# Patient Record
Sex: MALE | Race: ASIAN | NOT HISPANIC OR LATINO | URBAN - METROPOLITAN AREA
[De-identification: names, ages, dates, MRNs, and addresses within clinical notes are randomized per-mention and may not be internally consistent; named-entity substitution may affect disease eponyms.]

---

## 2024-03-28 ENCOUNTER — INPATIENT (INPATIENT)
Facility: HOSPITAL | Age: 59
LOS: 1 days | Discharge: HOME | DRG: 638 | End: 2024-03-30
Attending: FAMILY MEDICINE | Admitting: INTERNAL MEDICINE
Payer: MEDICAID

## 2024-03-28 VITALS
HEART RATE: 86 BPM | RESPIRATION RATE: 20 BRPM | OXYGEN SATURATION: 97 % | HEIGHT: 69 IN | DIASTOLIC BLOOD PRESSURE: 91 MMHG | SYSTOLIC BLOOD PRESSURE: 140 MMHG | WEIGHT: 169.09 LBS | TEMPERATURE: 97 F

## 2024-03-28 DIAGNOSIS — Z29.9 ENCOUNTER FOR PROPHYLACTIC MEASURES, UNSPECIFIED: ICD-10-CM

## 2024-03-28 DIAGNOSIS — E78.5 HYPERLIPIDEMIA, UNSPECIFIED: ICD-10-CM

## 2024-03-28 DIAGNOSIS — I10 ESSENTIAL (PRIMARY) HYPERTENSION: ICD-10-CM

## 2024-03-28 DIAGNOSIS — J44.9 CHRONIC OBSTRUCTIVE PULMONARY DISEASE, UNSPECIFIED: ICD-10-CM

## 2024-03-28 DIAGNOSIS — K21.9 GASTRO-ESOPHAGEAL REFLUX DISEASE WITHOUT ESOPHAGITIS: ICD-10-CM

## 2024-03-28 DIAGNOSIS — Z86.73 PERSONAL HISTORY OF TRANSIENT ISCHEMIC ATTACK (TIA), AND CEREBRAL INFARCTION WITHOUT RESIDUAL DEFICITS: ICD-10-CM

## 2024-03-28 DIAGNOSIS — N17.9 ACUTE KIDNEY FAILURE, UNSPECIFIED: ICD-10-CM

## 2024-03-28 DIAGNOSIS — E11.9 TYPE 2 DIABETES MELLITUS WITHOUT COMPLICATIONS: ICD-10-CM

## 2024-03-28 DIAGNOSIS — E11.00 TYPE 2 DIABETES MELLITUS WITH HYPEROSMOLARITY WITHOUT NONKETOTIC HYPERGLYCEMIC-HYPEROSMOLAR COMA (NKHHC): ICD-10-CM

## 2024-03-28 DIAGNOSIS — R79.89 OTHER SPECIFIED ABNORMAL FINDINGS OF BLOOD CHEMISTRY: ICD-10-CM

## 2024-03-28 DIAGNOSIS — M10.9 GOUT, UNSPECIFIED: ICD-10-CM

## 2024-03-28 LAB
ACETONE SERPL-MCNC: ABNORMAL
ALBUMIN SERPL ELPH-MCNC: 4.2 G/DL — SIGNIFICANT CHANGE UP (ref 3.3–5)
ALP SERPL-CCNC: 117 U/L — SIGNIFICANT CHANGE UP (ref 40–120)
ALT FLD-CCNC: 44 U/L — SIGNIFICANT CHANGE UP (ref 12–78)
ANION GAP SERPL CALC-SCNC: 9 MMOL/L — SIGNIFICANT CHANGE UP (ref 5–17)
ANION GAP SERPL CALC-SCNC: 9 MMOL/L — SIGNIFICANT CHANGE UP (ref 5–17)
APPEARANCE UR: CLEAR — SIGNIFICANT CHANGE UP
AST SERPL-CCNC: 25 U/L — SIGNIFICANT CHANGE UP (ref 15–37)
B-OH-BUTYR SERPL-SCNC: 1.6 MMOL/L — HIGH
BACTERIA # UR AUTO: ABNORMAL /HPF
BASE EXCESS BLDV CALC-SCNC: -1.6 MMOL/L — SIGNIFICANT CHANGE UP (ref -2–3)
BASOPHILS # BLD AUTO: 0.02 K/UL — SIGNIFICANT CHANGE UP (ref 0–0.2)
BASOPHILS NFR BLD AUTO: 0.2 % — SIGNIFICANT CHANGE UP (ref 0–2)
BILIRUB SERPL-MCNC: 1.4 MG/DL — HIGH (ref 0.2–1.2)
BILIRUB UR-MCNC: NEGATIVE — SIGNIFICANT CHANGE UP
BLOOD GAS COMMENTS, VENOUS: SIGNIFICANT CHANGE UP
BUN SERPL-MCNC: 26 MG/DL — HIGH (ref 7–23)
BUN SERPL-MCNC: 31 MG/DL — HIGH (ref 7–23)
CALCIUM SERPL-MCNC: 8.2 MG/DL — LOW (ref 8.5–10.1)
CALCIUM SERPL-MCNC: 9.6 MG/DL — SIGNIFICANT CHANGE UP (ref 8.5–10.1)
CHLORIDE SERPL-SCNC: 101 MMOL/L — SIGNIFICANT CHANGE UP (ref 96–108)
CHLORIDE SERPL-SCNC: 108 MMOL/L — SIGNIFICANT CHANGE UP (ref 96–108)
CK MB BLD-MCNC: 1.3 % — SIGNIFICANT CHANGE UP (ref 0–3.5)
CK MB CFR SERPL CALC: 1.1 NG/ML — SIGNIFICANT CHANGE UP (ref 0–3.6)
CK SERPL-CCNC: 84 U/L — SIGNIFICANT CHANGE UP (ref 26–308)
CO2 SERPL-SCNC: 23 MMOL/L — SIGNIFICANT CHANGE UP (ref 22–31)
CO2 SERPL-SCNC: 26 MMOL/L — SIGNIFICANT CHANGE UP (ref 22–31)
COLOR SPEC: YELLOW — SIGNIFICANT CHANGE UP
COMMENT - URINE: SIGNIFICANT CHANGE UP
CREAT SERPL-MCNC: 1.3 MG/DL — SIGNIFICANT CHANGE UP (ref 0.5–1.3)
CREAT SERPL-MCNC: 1.8 MG/DL — HIGH (ref 0.5–1.3)
DIFF PNL FLD: NEGATIVE — SIGNIFICANT CHANGE UP
EGFR: 43 ML/MIN/1.73M2 — LOW
EGFR: 64 ML/MIN/1.73M2 — SIGNIFICANT CHANGE UP
EOSINOPHIL # BLD AUTO: 0.06 K/UL — SIGNIFICANT CHANGE UP (ref 0–0.5)
EOSINOPHIL NFR BLD AUTO: 0.6 % — SIGNIFICANT CHANGE UP (ref 0–6)
FLUAV AG NPH QL: SIGNIFICANT CHANGE UP
FLUBV AG NPH QL: SIGNIFICANT CHANGE UP
GAS PNL BLDV: SIGNIFICANT CHANGE UP
GLUCOSE SERPL-MCNC: 309 MG/DL — HIGH (ref 70–99)
GLUCOSE SERPL-MCNC: 679 MG/DL — CRITICAL HIGH (ref 70–99)
GLUCOSE UR QL: >=1000 MG/DL
HCO3 BLDV-SCNC: 25 MMOL/L — SIGNIFICANT CHANGE UP (ref 22–29)
HCT VFR BLD CALC: 45 % — SIGNIFICANT CHANGE UP (ref 39–50)
HGB BLD-MCNC: 16 G/DL — SIGNIFICANT CHANGE UP (ref 13–17)
IMM GRANULOCYTES NFR BLD AUTO: 0.6 % — SIGNIFICANT CHANGE UP (ref 0–0.9)
KETONES UR-MCNC: ABNORMAL MG/DL
LACTATE SERPL-SCNC: 1.9 MMOL/L — SIGNIFICANT CHANGE UP (ref 0.7–2)
LEUKOCYTE ESTERASE UR-ACNC: ABNORMAL
LIDOCAIN IGE QN: 154 U/L — HIGH (ref 13–75)
LYMPHOCYTES # BLD AUTO: 1.4 K/UL — SIGNIFICANT CHANGE UP (ref 1–3.3)
LYMPHOCYTES # BLD AUTO: 13.3 % — SIGNIFICANT CHANGE UP (ref 13–44)
MAGNESIUM SERPL-MCNC: 2.6 MG/DL — SIGNIFICANT CHANGE UP (ref 1.6–2.6)
MCHC RBC-ENTMCNC: 32.8 PG — SIGNIFICANT CHANGE UP (ref 27–34)
MCHC RBC-ENTMCNC: 35.6 GM/DL — SIGNIFICANT CHANGE UP (ref 32–36)
MCV RBC AUTO: 92.2 FL — SIGNIFICANT CHANGE UP (ref 80–100)
MONOCYTES # BLD AUTO: 0.78 K/UL — SIGNIFICANT CHANGE UP (ref 0–0.9)
MONOCYTES NFR BLD AUTO: 7.4 % — SIGNIFICANT CHANGE UP (ref 2–14)
NEUTROPHILS # BLD AUTO: 8.2 K/UL — HIGH (ref 1.8–7.4)
NEUTROPHILS NFR BLD AUTO: 77.9 % — HIGH (ref 43–77)
NITRITE UR-MCNC: NEGATIVE — SIGNIFICANT CHANGE UP
NRBC # BLD: 0 /100 WBCS — SIGNIFICANT CHANGE UP (ref 0–0)
PCO2 BLDV: 55 MMHG — SIGNIFICANT CHANGE UP (ref 42–55)
PH BLDV: 7.27 — LOW (ref 7.32–7.43)
PH UR: 6.5 — SIGNIFICANT CHANGE UP (ref 5–8)
PHOSPHATE SERPL-MCNC: 4.3 MG/DL — SIGNIFICANT CHANGE UP (ref 2.5–4.5)
PLATELET # BLD AUTO: 242 K/UL — SIGNIFICANT CHANGE UP (ref 150–400)
PO2 BLDV: 36 MMHG — SIGNIFICANT CHANGE UP (ref 25–45)
POTASSIUM SERPL-MCNC: 3.7 MMOL/L — SIGNIFICANT CHANGE UP (ref 3.5–5.3)
POTASSIUM SERPL-MCNC: 4.2 MMOL/L — SIGNIFICANT CHANGE UP (ref 3.5–5.3)
POTASSIUM SERPL-SCNC: 3.7 MMOL/L — SIGNIFICANT CHANGE UP (ref 3.5–5.3)
POTASSIUM SERPL-SCNC: 4.2 MMOL/L — SIGNIFICANT CHANGE UP (ref 3.5–5.3)
PROT SERPL-MCNC: 8 G/DL — SIGNIFICANT CHANGE UP (ref 6–8.3)
PROT UR-MCNC: NEGATIVE MG/DL — SIGNIFICANT CHANGE UP
RBC # BLD: 4.88 M/UL — SIGNIFICANT CHANGE UP (ref 4.2–5.8)
RBC # FLD: 14.1 % — SIGNIFICANT CHANGE UP (ref 10.3–14.5)
RBC CASTS # UR COMP ASSIST: 0 /HPF — SIGNIFICANT CHANGE UP (ref 0–4)
RSV RNA NPH QL NAA+NON-PROBE: SIGNIFICANT CHANGE UP
SAO2 % BLDV: 60.7 % — LOW (ref 67–88)
SARS-COV-2 RNA SPEC QL NAA+PROBE: SIGNIFICANT CHANGE UP
SODIUM SERPL-SCNC: 136 MMOL/L — SIGNIFICANT CHANGE UP (ref 135–145)
SODIUM SERPL-SCNC: 140 MMOL/L — SIGNIFICANT CHANGE UP (ref 135–145)
SP GR SPEC: 1.03 — SIGNIFICANT CHANGE UP (ref 1–1.03)
TROPONIN I, HIGH SENSITIVITY RESULT: 117.1 NG/L — HIGH
TROPONIN I, HIGH SENSITIVITY RESULT: 117.8 NG/L — HIGH
TROPONIN I, HIGH SENSITIVITY RESULT: 96.4 NG/L — HIGH
UROBILINOGEN FLD QL: 0.2 MG/DL — SIGNIFICANT CHANGE UP (ref 0.2–1)
WBC # BLD: 10.52 K/UL — HIGH (ref 3.8–10.5)
WBC # FLD AUTO: 10.52 K/UL — HIGH (ref 3.8–10.5)
WBC UR QL: 12 /HPF — HIGH (ref 0–5)

## 2024-03-28 PROCEDURE — 99285 EMERGENCY DEPT VISIT HI MDM: CPT

## 2024-03-28 PROCEDURE — 71045 X-RAY EXAM CHEST 1 VIEW: CPT | Mod: 26

## 2024-03-28 PROCEDURE — 70450 CT HEAD/BRAIN W/O DYE: CPT | Mod: 26,MC

## 2024-03-28 PROCEDURE — 99223 1ST HOSP IP/OBS HIGH 75: CPT | Mod: GC

## 2024-03-28 PROCEDURE — 74176 CT ABD & PELVIS W/O CONTRAST: CPT | Mod: 26,MC

## 2024-03-28 RX ORDER — SODIUM CHLORIDE 9 MG/ML
1000 INJECTION INTRAMUSCULAR; INTRAVENOUS; SUBCUTANEOUS ONCE
Refills: 0 | Status: COMPLETED | OUTPATIENT
Start: 2024-03-28 | End: 2024-03-28

## 2024-03-28 RX ORDER — PANTOPRAZOLE SODIUM 20 MG/1
40 TABLET, DELAYED RELEASE ORAL
Refills: 0 | Status: DISCONTINUED | OUTPATIENT
Start: 2024-03-28 | End: 2024-03-29

## 2024-03-28 RX ORDER — SODIUM CHLORIDE 9 MG/ML
1000 INJECTION, SOLUTION INTRAVENOUS
Refills: 0 | Status: DISCONTINUED | OUTPATIENT
Start: 2024-03-28 | End: 2024-03-30

## 2024-03-28 RX ORDER — FENOFIBRATE,MICRONIZED 130 MG
1 CAPSULE ORAL
Refills: 0 | DISCHARGE

## 2024-03-28 RX ORDER — INSULIN LISPRO 100/ML
VIAL (ML) SUBCUTANEOUS
Refills: 0 | Status: DISCONTINUED | OUTPATIENT
Start: 2024-03-28 | End: 2024-03-30

## 2024-03-28 RX ORDER — INSULIN GLARGINE 100 [IU]/ML
12 INJECTION, SOLUTION SUBCUTANEOUS AT BEDTIME
Refills: 0 | Status: DISCONTINUED | OUTPATIENT
Start: 2024-03-28 | End: 2024-03-29

## 2024-03-28 RX ORDER — MONTELUKAST 4 MG/1
10 TABLET, CHEWABLE ORAL DAILY
Refills: 0 | Status: DISCONTINUED | OUTPATIENT
Start: 2024-03-28 | End: 2024-03-30

## 2024-03-28 RX ORDER — HEPARIN SODIUM 5000 [USP'U]/ML
5000 INJECTION INTRAVENOUS; SUBCUTANEOUS EVERY 12 HOURS
Refills: 0 | Status: DISCONTINUED | OUTPATIENT
Start: 2024-03-28 | End: 2024-03-30

## 2024-03-28 RX ORDER — ALLOPURINOL 300 MG
0.5 TABLET ORAL
Refills: 0 | DISCHARGE

## 2024-03-28 RX ORDER — INSULIN LISPRO 100/ML
10 VIAL (ML) SUBCUTANEOUS ONCE
Refills: 0 | Status: COMPLETED | OUTPATIENT
Start: 2024-03-28 | End: 2024-03-28

## 2024-03-28 RX ORDER — DEXTROSE 50 % IN WATER 50 %
25 SYRINGE (ML) INTRAVENOUS ONCE
Refills: 0 | Status: DISCONTINUED | OUTPATIENT
Start: 2024-03-28 | End: 2024-03-30

## 2024-03-28 RX ORDER — INSULIN LISPRO 100/ML
VIAL (ML) SUBCUTANEOUS AT BEDTIME
Refills: 0 | Status: DISCONTINUED | OUTPATIENT
Start: 2024-03-28 | End: 2024-03-30

## 2024-03-28 RX ORDER — ACETAMINOPHEN 500 MG
650 TABLET ORAL EVERY 6 HOURS
Refills: 0 | Status: DISCONTINUED | OUTPATIENT
Start: 2024-03-28 | End: 2024-03-30

## 2024-03-28 RX ORDER — INSULIN HUMAN 100 [IU]/ML
8 INJECTION, SOLUTION SUBCUTANEOUS ONCE
Refills: 0 | Status: COMPLETED | OUTPATIENT
Start: 2024-03-28 | End: 2024-03-28

## 2024-03-28 RX ORDER — ALLOPURINOL 300 MG
50 TABLET ORAL DAILY
Refills: 0 | Status: DISCONTINUED | OUTPATIENT
Start: 2024-03-28 | End: 2024-03-30

## 2024-03-28 RX ORDER — MONTELUKAST 4 MG/1
1 TABLET, CHEWABLE ORAL
Refills: 0 | DISCHARGE

## 2024-03-28 RX ORDER — SODIUM CHLORIDE 9 MG/ML
1000 INJECTION INTRAMUSCULAR; INTRAVENOUS; SUBCUTANEOUS
Refills: 0 | Status: DISCONTINUED | OUTPATIENT
Start: 2024-03-28 | End: 2024-03-29

## 2024-03-28 RX ORDER — ONDANSETRON 8 MG/1
4 TABLET, FILM COATED ORAL EVERY 8 HOURS
Refills: 0 | Status: DISCONTINUED | OUTPATIENT
Start: 2024-03-28 | End: 2024-03-30

## 2024-03-28 RX ORDER — DEXTROSE 50 % IN WATER 50 %
15 SYRINGE (ML) INTRAVENOUS ONCE
Refills: 0 | Status: DISCONTINUED | OUTPATIENT
Start: 2024-03-28 | End: 2024-03-30

## 2024-03-28 RX ORDER — FENOFIBRATE,MICRONIZED 130 MG
145 CAPSULE ORAL DAILY
Refills: 0 | Status: DISCONTINUED | OUTPATIENT
Start: 2024-03-28 | End: 2024-03-30

## 2024-03-28 RX ORDER — LANOLIN ALCOHOL/MO/W.PET/CERES
3 CREAM (GRAM) TOPICAL AT BEDTIME
Refills: 0 | Status: DISCONTINUED | OUTPATIENT
Start: 2024-03-28 | End: 2024-03-30

## 2024-03-28 RX ORDER — SUCRALFATE 1 G
1 TABLET ORAL
Refills: 0 | DISCHARGE

## 2024-03-28 RX ORDER — DEXTROSE 50 % IN WATER 50 %
12.5 SYRINGE (ML) INTRAVENOUS ONCE
Refills: 0 | Status: DISCONTINUED | OUTPATIENT
Start: 2024-03-28 | End: 2024-03-30

## 2024-03-28 RX ORDER — GLUCAGON INJECTION, SOLUTION 0.5 MG/.1ML
1 INJECTION, SOLUTION SUBCUTANEOUS ONCE
Refills: 0 | Status: DISCONTINUED | OUTPATIENT
Start: 2024-03-28 | End: 2024-03-30

## 2024-03-28 RX ORDER — FAMOTIDINE 10 MG/ML
20 INJECTION INTRAVENOUS AT BEDTIME
Refills: 0 | Status: DISCONTINUED | OUTPATIENT
Start: 2024-03-28 | End: 2024-03-29

## 2024-03-28 RX ORDER — IPRATROPIUM/ALBUTEROL SULFATE 18-103MCG
3 AEROSOL WITH ADAPTER (GRAM) INHALATION EVERY 4 HOURS
Refills: 0 | Status: DISCONTINUED | OUTPATIENT
Start: 2024-03-28 | End: 2024-03-29

## 2024-03-28 RX ADMIN — SODIUM CHLORIDE 125 MILLILITER(S): 9 INJECTION INTRAMUSCULAR; INTRAVENOUS; SUBCUTANEOUS at 23:20

## 2024-03-28 RX ADMIN — SODIUM CHLORIDE 1000 MILLILITER(S): 9 INJECTION INTRAMUSCULAR; INTRAVENOUS; SUBCUTANEOUS at 14:00

## 2024-03-28 RX ADMIN — HEPARIN SODIUM 5000 UNIT(S): 5000 INJECTION INTRAVENOUS; SUBCUTANEOUS at 17:49

## 2024-03-28 RX ADMIN — SODIUM CHLORIDE 1000 MILLILITER(S): 9 INJECTION INTRAMUSCULAR; INTRAVENOUS; SUBCUTANEOUS at 13:37

## 2024-03-28 RX ADMIN — Medication 3 MILLIGRAM(S): at 23:20

## 2024-03-28 RX ADMIN — Medication 100 MILLIGRAM(S): at 17:47

## 2024-03-28 RX ADMIN — Medication 10 UNIT(S): at 15:37

## 2024-03-28 RX ADMIN — Medication 200 MILLIGRAM(S): at 17:46

## 2024-03-28 RX ADMIN — SODIUM CHLORIDE 125 MILLILITER(S): 9 INJECTION INTRAMUSCULAR; INTRAVENOUS; SUBCUTANEOUS at 17:48

## 2024-03-28 RX ADMIN — SODIUM CHLORIDE 1000 MILLILITER(S): 9 INJECTION INTRAMUSCULAR; INTRAVENOUS; SUBCUTANEOUS at 14:01

## 2024-03-28 RX ADMIN — Medication 6: at 22:28

## 2024-03-28 RX ADMIN — FAMOTIDINE 20 MILLIGRAM(S): 10 INJECTION INTRAVENOUS at 22:27

## 2024-03-28 RX ADMIN — Medication 100 MILLIGRAM(S): at 22:27

## 2024-03-28 RX ADMIN — INSULIN HUMAN 8 UNIT(S): 100 INJECTION, SOLUTION SUBCUTANEOUS at 13:40

## 2024-03-28 RX ADMIN — Medication 200 MILLIGRAM(S): at 23:16

## 2024-03-28 RX ADMIN — SODIUM CHLORIDE 1000 MILLILITER(S): 9 INJECTION INTRAMUSCULAR; INTRAVENOUS; SUBCUTANEOUS at 13:21

## 2024-03-28 RX ADMIN — INSULIN GLARGINE 12 UNIT(S): 100 INJECTION, SOLUTION SUBCUTANEOUS at 22:27

## 2024-03-28 NOTE — H&P ADULT - PROBLEM SELECTOR PLAN 4
Two prior TIA's in the past.  - CT Head: No acute intracranial  hemorrhage, mass effect or midline shift. Generalized volume loss. Mild paranasal sinusitis  - Start ASA   - Continue statin   - F/u AM lipid panel   - Consider MRI due to recent slurred speech  - Neuro consult, f/u recs Two prior TIA's in the past.  - CT Head: No acute intracranial  hemorrhage, mass effect or midline shift. Generalized volume loss. Mild paranasal sinusitis  - Start ASA   - Start statin   - F/u AM lipid panel   - Consider MRI due to recent slurred speech  - Neuro consult, f/u recs Two prior TIA's in the past.  - CT Head: No acute intracranial  hemorrhage, mass effect or midline shift. Generalized volume loss. Mild paranasal sinusitis  - Start ASA   - Continue fenofibrate    - F/u AM lipid panel   - Consider MRI due to recent slurred speech  - Neuro consult, f/u recs Two prior TIA's in the past.  - CT Head: No acute intracranial  hemorrhage, mass effect or midline shift. Generalized volume loss. Mild paranasal sinusitis  - Start ASA   - Continue fenofibrate    - F/u AM lipid panel   - Patient appears at baseline currently. If AMS develops, would consider repeat CT head and MRI head

## 2024-03-28 NOTE — ED PROVIDER NOTE - CLINICAL SUMMARY MEDICAL DECISION MAKING FREE TEXT BOX
57 y/o M PMH of CVA, HTN presenting with slurred speech, lethargy and polyuria   Hyperglycemia >600 FS  Suspect new onset DM-->r/o DKA, HHS  Plan for screening labs, A1c, IVF, Insulin   Will need admission for glycemic management. 59 y/o M PMH of CVA, HTN presenting with slurred speech, lethargy and polyuria   Hyperglycemia >600 FS  Suspect new onset DM-->r/o DKA, HHS  Plan for screening labs, A1c, IVF, Insulin   Will need admission for glycemic management.  No neurological deficits on exam--->suspect intermittent slurred speech metabolic in nature. Will obtain CTH and CT a/p for comprehensive imaging. 59 y/o M PMH of CVA, HTN presenting with slurred speech, lethargy and polyuria   Hyperglycemia >600 FS  Suspect new onset DM-->r/o DKA, HHS, likely steroid-induced hyperglycemia   Plan for screening labs, A1c, IVF, Insulin   Will need admission for glycemic management.  No neurological deficits on exam--->suspect intermittent slurred speech metabolic in nature. Will obtain CTH and CT a/p for comprehensive imaging. 57 y/o M PMH of CVA, HTN, HLD presenting with slurred speech, lethargy and polyuria   Hyperglycemia >600 FS  Suspect new onset DM-->r/o DKA, HHS, likely steroid-induced hyperglycemia   Plan for screening labs, A1c, IVF, Insulin   Will need admission for glycemic management.  No neurological deficits on exam--->suspect intermittent slurred speech metabolic in nature. Will obtain CTH and CT a/p for comprehensive imaging.

## 2024-03-28 NOTE — ED PROVIDER NOTE - PROGRESS NOTE DETAILS
No evidence of DKA. Cardiology consulted for elevated troponin-repeat ordered. FS improving. Admelog 10 units ordered and admitted to medical service.

## 2024-03-28 NOTE — H&P ADULT - ASSESSMENT
Patient is  59yo M with a PMH of CVA, HTN, Gout, HLD, GERD who presents to the ED with weakness and polyuria.
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
## 2024-03-28 NOTE — H&P ADULT - PROBLEM SELECTOR PLAN 8
Chronic   - Continue home omeprazole   - Continue home sulcralfate Chronic   - Continue home omeprazole  - Continue home famotidine    - Continue home sulcralfate Chronic   - Continue home omeprazole  - Continue home famotidine

## 2024-03-28 NOTE — H&P ADULT - PROBLEM SELECTOR PLAN 5
Significantly large hiatal hernia with part of the stomach in the thorax. Patient has minimal symptoms at this time. Advised to start on pantoprazole 40 mg 1 tablet once daily.   Dietary restrictions of spicy food, pop, caffeine, chocolate, large meals di Chronic cough,  recently started on prednisone   - Hold prednisone in setting of [facility]   - Continue home montelukast  - Continue home singular  - Start duonebs and tessalon pearls for cough Chronic cough,  recently started on prednisone   - Hold prednisone in setting of HHS   - Continue home montelukast  - Start olimpia and tessalon pearls for cough  - Pulm Dr. Gaffney consulted, f/u recs

## 2024-03-28 NOTE — H&P ADULT - PROBLEM SELECTOR PLAN 3
Elevated trops on admission, likely 2/2 [facility]   - EKbpm, QTc 420, sinus tachy  - Trop 96.4 --> 117.8  - Trend trops to peak  - Repeat EKG in AM   - F/u baseline TTE   - Start ASA due to hx prior CVA   - Continue statin   - Cardio consulted, f/u recs Elevated trops on admission, likely 2/2 [facility]   - EKbpm, QTc 420, sinus tachy  - Trop 96.4 --> 117.8  - Trend trops to peak  - Repeat EKG in AM   - F/u baseline TTE   - Start ASA due to hx prior CVA   - Start statin   - Cardio consulted, f/u recs Elevated trops on admission, likely 2/2 [facility]   - EKbpm, QTc 420, sinus tachy  - Trop 96.4 --> 117.8  - Trend trops to peak  - Repeat EKG in AM   - F/u baseline TTE   - Start ASA due to hx prior CVA   - Continue fenofibrate   - Cardio consulted, f/u recs

## 2024-03-28 NOTE — ED ADULT NURSE NOTE - CAS TRG GEN SKIN CONDITION
"April 27, 2017    Return visit    Patient returns today for follow up.  She is here to discuss urodynamics.  She reports that she still has residual issues from her concussion.  It is unclear why but she is taking immediate release oxybutynin and trospium.  Does have issues with constipation.  She denies any changes in her health since last visit.    Per patient the biggest issue that she has is that she has episodes of sudden urge incontinence, not daily but often enough that it is bothersome and she cannot predict when they will happen as there is no obvious pattern from her recollection or the voiding diary.      /70  Pulse 64  Ht 1.626 m (5' 4\")  Wt 74.3 kg (163 lb 14.4 oz)  BMI 28.13 kg/m2  She is comfortable, in no distress, non-labored breathing.      Urodynamics reviewed:  On initial testing her first desire was at 208mL and at a volume of 295mL it appears that she had a sudden urge and leaked out the entire volume.  Catheters at that time appear to have dropped out so unable to document DOI.  On this initial phase of filling she appears to have some impaired compliance with a Pdet max of 34 cm H2O.  When patient was filled the second time she demonstrated normal compliance with a strong desire at 283mL and what seems to be stress induced DOI.  She was able to void 250mL with Qmax 38, Qave 16 and post test PVR of 39mL    A/P: 64 year old F with mixed urinary incontinence, constipation    We discussed it was unclear about the differences in the detrusor pressures from the two waves of the study, but given normal renal function and no hydronephrosis on recent CT will just plan to reassess if needed.    As she has had some improvement with medications but issues with constipation will have her stop the oxybutynin and immediate release trospium  Will start daily extended release trospium.  Have chosen tropsium to avoid the confusion that can happen with other anticholinergics especially as patient " still has issues after her concussion.    Daily fiber for the constipation    She will contact the office in one month to see how she is doing on the trospium and at that time can consider starting 25mg mirabegeron.  We discussed the common side effects of this    RTC in 3 months, to reassess and check PVR, sooner if needed    15 minutes were spent with the patient today, > 50% in counseling and coordination of care    Milagro Dimas MD MPH   of Urology    CC  Patient Care Team:  Joe Cardenas MD as PCP - General (Family Practice)  Kelsey Kenyon NP as MD (Nurse Practitioner)  Kayla Amador PA-C as Physician Assistant (Physician Assistant)  Milagro Dimas MD as MD (Urology)  ESTABLISHED PATIENT     Warm

## 2024-03-28 NOTE — H&P ADULT - PROBLEM SELECTOR PLAN 1
Glucose on admission 679. No personal history of DM.   - Sugars likely exacerbated by recent Prednisone use   - CT a/p: No evidence of acute inflammatory or obstructive process in the abdomen and pelvis.  - UA: pH 6.5, trace ketone, trace leuk esterase, wbc 12, occasional bacteria, glucose >1000  - VBG: pH 7.27, pCO2 55, pO2 36, HCO3 25, Oxygen Saturation 60.7  - S/p 10u Insulin x1, 8u Insulin x1, 2L NaCl bolus   - Glucose 679 --> 440  - Anion gap 9  - Acetone small  - Start continuous IVF   - Start MDISS  - Hypoglycemia protocol   - Monitor fingersticks   - F/u AM A1c  - F/u Beta hydroxybutyrate   - Hold home prednisone   - Endo Dr. Perlman consulted, f/u recs Glucose on admission 679. No personal history of DM.   - Sugars likely exacerbated by recent Prednisone use   - CT a/p: No evidence of acute inflammatory or obstructive process in the abdomen and pelvis.  - UA: pH 6.5, trace ketone, trace leuk esterase, wbc 12, occasional bacteria, glucose >1000  - VBG: pH 7.27, pCO2 55, pO2 36, HCO3 25, Oxygen Saturation 60.7  - S/p 10u Insulin x1, 8u Insulin x1, 2L NaCl bolus   - Glucose 679 --> 440  - Anion gap 9  - Acetone small  - Start continuous IVF   - Start Lantus 12u  - Start MDISS  - Hypoglycemia protocol   - Monitor fingersticks   - F/u AM A1c  - F/u repeat BMP to assess for opening of anion gap   - F/u Beta hydroxybutyrate   - Hold home prednisone   - Endo Dr. Perlman consulted, f/u recs

## 2024-03-28 NOTE — ED PROVIDER NOTE - OBJECTIVE STATEMENT
57 y/o M PMH of CVA, HTN presenting with slurred speech, lethargy and polyuria     Patient is accompanied by daughter who reports intermittent slurred speech, worse in morning as well as polyuria, polydipsia and dry throat. No history of DM. No fevers/chills. Has noted vomiting x1 per day for the last two days. Last BM yesterday. Notes epigastric pain, no chest pain 59 y/o M PMH of CVA, HTN presenting with slurred speech, lethargy and polyuria     Patient is accompanied by daughter who reports intermittent slurred speech, worse in morning as well as polyuria, polydipsia and dry throat. No history of DM. No fevers/chills. Has noted vomiting x1 per day for the last two days. Last BM yesterday. Notes epigastric pain, no chest pain. Patient is currently taking prednisone prescribed by PCP. 57 y/o M PMH of CVA, HTN, HLD presenting with slurred speech, lethargy and polyuria     Patient is accompanied by daughter who reports intermittent slurred speech, worse in morning as well as polyuria, polydipsia and dry throat. No history of DM. No fevers/chills. Has noted vomiting x1 per day for the last two days. Last BM yesterday. Notes epigastric pain, no chest pain. Patient is currently taking prednisone prescribed by PCP.

## 2024-03-28 NOTE — CONSULT NOTE ADULT - NS ATTEND AMEND GEN_ALL_CORE FT
58 year old male past medical history of CVA x 2 sp TPA ten years ago, htn, hld, gout, presenting from home with increased urination, thirst, ten pound weight loss decreased appetite. IN ed found with glucose > 600 with elevated troponin.    Presenting with lightheadedness, increased urination and increased thirst  Found to be in HHS in the ER with glucose >600  Cr up to 1.8, unknown baseline  Trop elevated to 90s  Trend trop to peak or plateau. Likely demand ischemia in the setting of HHS and KEREN.   EKG with ST  Does complain of some right sided chest pain that seems to be MSK in nature and is reproducible on exam  Complains of some SOB that has been ongoing for months   Had cardiac workup 2 yrs ago and states it was normal  History of CVA, not on ASA any longer for unclear reason, and having some intermittent paresthesias and muscle stiffness in right hand  Would obtain Neuro consult. CTH without acute findings.   Obtain TFTs, a1c, lipid panel  Should be on mod-high intensity statin and ASA.  Switch Lisinopril for Losartan given history of chronic cough, will see if this helps.   Tele monitoring  TTE  HHS treatment as per primary  Will continue to follow closely.

## 2024-03-28 NOTE — PROGRESS NOTE ADULT - SUBJECTIVE AND OBJECTIVE BOX
Forest is a 58y old  Male who presents with a chief complaint of HHS (28 Mar 2024 15:58)      INTERVAL HPI/OVERNIGHT EVENTS:    57 YO male lifelong nonsmoker with history of hypertension and GERD, who presented to ER with polyuria and polydipsia. He had been evaluated in our office on 2023 due to persistent cough, which has been present for over 2 months. There was some initial response to treatment with antibiotics, inhalers, and cough medications, but cough returned several days later. Lisinopril was stopped, but cough persisted. Pulmonary function testing had revealed restrictive ventilatory impairment with normal diffusion. There is also a history of GERD but treatment did not affect the cough. There is no history of asthma, pneumonia, TB, emphysema, or toxic exposures. The patient underwent upper endoscopy in 2023 and he was found to have duodenitis and gastritis. There was no change in cough after treatment with Protonix. Surgical history is positive for cholecystectomy. There is no history of ETOH or drug abuse. Medications at home had included Tricor, Allopurinol, Flonase, Hydrochlorothiazide, and Ambien. He denies any allergies. CT chest prior to admission had revealed a 3 mm left lower lobe pulmonary nodule, splenomegaly, and hepatic steatosis.     MEDICATIONS  (STANDING):  albuterol/ipratropium for Nebulization 3 milliLiter(s) Nebulizer every 4 hours  allopurinol 50 milliGRAM(s) Oral daily  benzonatate 100 milliGRAM(s) Oral three times a day  dextrose 5%. 1000 milliLiter(s) (50 mL/Hr) IV Continuous <Continuous>  dextrose 5%. 1000 milliLiter(s) (100 mL/Hr) IV Continuous <Continuous>  dextrose 50% Injectable 25 Gram(s) IV Push once  dextrose 50% Injectable 25 Gram(s) IV Push once  dextrose 50% Injectable 12.5 Gram(s) IV Push once  famotidine    Tablet 20 milliGRAM(s) Oral at bedtime  fenofibrate Tablet 145 milliGRAM(s) Oral daily  glucagon  Injectable 1 milliGRAM(s) IntraMuscular once  guaiFENesin Oral Liquid (Sugar-Free) 200 milliGRAM(s) Oral every 6 hours  heparin   Injectable 5000 Unit(s) SubCutaneous every 12 hours  insulin glargine Injectable (LANTUS) 12 Unit(s) SubCutaneous at bedtime  insulin lispro (ADMELOG) corrective regimen sliding scale   SubCutaneous at bedtime  insulin lispro (ADMELOG) corrective regimen sliding scale   SubCutaneous three times a day before meals  montelukast 10 milliGRAM(s) Oral daily  pantoprazole    Tablet 40 milliGRAM(s) Oral before breakfast  sodium chloride 0.9%. 1000 milliLiter(s) (125 mL/Hr) IV Continuous <Continuous>      MEDICATIONS  (PRN):  acetaminophen     Tablet .. 650 milliGRAM(s) Oral every 6 hours PRN Temp greater or equal to 38C (100.4F), Mild Pain (1 - 3)  aluminum hydroxide/magnesium hydroxide/simethicone Suspension 30 milliLiter(s) Oral every 4 hours PRN Dyspepsia  dextrose Oral Gel 15 Gram(s) Oral once PRN Blood Glucose LESS THAN 70 milliGRAM(s)/deciliter  melatonin 3 milliGRAM(s) Oral at bedtime PRN Insomnia  ondansetron Injectable 4 milliGRAM(s) IV Push every 8 hours PRN Nausea and/or Vomiting      Allergies    No Known Allergies    Intolerances        PAST MEDICAL & SURGICAL HISTORY:  HTN (hypertension)      Stroke      HLD (hyperlipidemia)      Gout          Vital Signs Last 24 Hrs  T(C): 36.6 (28 Mar 2024 17:10), Max: 36.6 (28 Mar 2024 17:10)  T(F): 97.8 (28 Mar 2024 17:10), Max: 97.8 (28 Mar 2024 17:10)  HR: 91 (28 Mar 2024 17:10) (86 - 91)  BP: 122/87 (28 Mar 2024 17:10) (122/87 - 140/91)  BP(mean): --  RR: 18 (28 Mar 2024 17:10) (18 - 20)  SpO2: 99% (28 Mar 2024 17:10) (97% - 99%)    Parameters below as of 28 Mar 2024 12:58  Patient On (Oxygen Delivery Method): room air        PHYSICAL EXAMINATION:    GENERAL: The patient is awake and alert in no apparent distress.     HEENT: Head is normocephalic and atraumatic.    NECK: no JVD    LUNGS: Clear to auscultation without wheezing, rales or rhonchi; respirations unlabored    HEART: Regular rate and rhythm without murmur.    ABDOMEN: Soft, nontender, and nondistended.      EXTREMITIES: Without any cyanosis, clubbing, rash, lesions or edema.    NEUROLOGIC: Grossly intact.    SKIN: No ulceration or induration present.      LABS:                        16.0   10.52 )-----------( 242      ( 28 Mar 2024 13:30 )             45.0         136  |  101  |  31<H>  ----------------------------<  679<HH>  4.2   |  26  |  1.80<H>    Ca    9.6      28 Mar 2024 13:30  Phos  4.3       Mg     2.6         TPro  8.0  /  Alb  4.2  /  TBili  1.4<H>  /  DBili  x   /  AST  25  /  ALT  44  /  AlkPhos  117        Urinalysis Basic - ( 28 Mar 2024 14:08 )    Color: Yellow / Appearance: Clear / S.029 / pH: x  Gluc: x / Ketone: Trace mg/dL  / Bili: Negative / Urobili: 0.2 mg/dL   Blood: x / Protein: Negative mg/dL / Nitrite: Negative   Leuk Esterase: Trace / RBC: 0 /HPF / WBC 12 /HPF   Sq Epi: x / Non Sq Epi: x / Bacteria: Occasional /HPF                Lactate, Blood: 1.9 mmol/L (24 @ 13:58)          Assessment:    Persistent cough - etiology uncertain  New onset Diabetes  Acute Kidney Injury  Hx GERD      Plan:    Duoneb QID  IV fluids  Insulin as needed  Continue Protonix     Forest is a 58y old  Male who presents with a chief complaint of HHS (28 Mar 2024 15:58)      INTERVAL HPI/OVERNIGHT EVENTS:    59 YO male lifelong nonsmoker with history of hypertension, S/P CVA x 2, and GERD, who presented to ER with polyuria and polydipsia. He had been evaluated in our office on 2023 due to persistent cough, which has been present for over 2 months. There was some initial response to treatment with antibiotics, inhalers, and cough medications, but cough returned several days later. Lisinopril was stopped, but cough persisted. Pulmonary function testing had revealed restrictive ventilatory impairment with normal diffusion. There is also a history of GERD but treatment did not affect the cough. There is no history of asthma, pneumonia, TB, emphysema, or toxic exposures. The patient underwent upper endoscopy in 2023 and he was found to have duodenitis and gastritis. There was no change in cough after treatment with Protonix. Surgical history is positive for cholecystectomy. There is no history of ETOH or drug abuse. Medications at home had included Tricor, Allopurinol, Flonase, Hydrochlorothiazide, and Ambien. He denies any allergies. CT chest prior to admission had revealed a 3 mm left lower lobe pulmonary nodule, splenomegaly, and hepatic steatosis.     MEDICATIONS  (STANDING):  albuterol/ipratropium for Nebulization 3 milliLiter(s) Nebulizer every 4 hours  allopurinol 50 milliGRAM(s) Oral daily  benzonatate 100 milliGRAM(s) Oral three times a day  dextrose 5%. 1000 milliLiter(s) (50 mL/Hr) IV Continuous <Continuous>  dextrose 5%. 1000 milliLiter(s) (100 mL/Hr) IV Continuous <Continuous>  dextrose 50% Injectable 25 Gram(s) IV Push once  dextrose 50% Injectable 25 Gram(s) IV Push once  dextrose 50% Injectable 12.5 Gram(s) IV Push once  famotidine    Tablet 20 milliGRAM(s) Oral at bedtime  fenofibrate Tablet 145 milliGRAM(s) Oral daily  glucagon  Injectable 1 milliGRAM(s) IntraMuscular once  guaiFENesin Oral Liquid (Sugar-Free) 200 milliGRAM(s) Oral every 6 hours  heparin   Injectable 5000 Unit(s) SubCutaneous every 12 hours  insulin glargine Injectable (LANTUS) 12 Unit(s) SubCutaneous at bedtime  insulin lispro (ADMELOG) corrective regimen sliding scale   SubCutaneous at bedtime  insulin lispro (ADMELOG) corrective regimen sliding scale   SubCutaneous three times a day before meals  montelukast 10 milliGRAM(s) Oral daily  pantoprazole    Tablet 40 milliGRAM(s) Oral before breakfast  sodium chloride 0.9%. 1000 milliLiter(s) (125 mL/Hr) IV Continuous <Continuous>      MEDICATIONS  (PRN):  acetaminophen     Tablet .. 650 milliGRAM(s) Oral every 6 hours PRN Temp greater or equal to 38C (100.4F), Mild Pain (1 - 3)  aluminum hydroxide/magnesium hydroxide/simethicone Suspension 30 milliLiter(s) Oral every 4 hours PRN Dyspepsia  dextrose Oral Gel 15 Gram(s) Oral once PRN Blood Glucose LESS THAN 70 milliGRAM(s)/deciliter  melatonin 3 milliGRAM(s) Oral at bedtime PRN Insomnia  ondansetron Injectable 4 milliGRAM(s) IV Push every 8 hours PRN Nausea and/or Vomiting      Allergies    No Known Allergies    Intolerances        PAST MEDICAL & SURGICAL HISTORY:  HTN (hypertension)      Stroke      HLD (hyperlipidemia)      Gout          Vital Signs Last 24 Hrs  T(C): 36.6 (28 Mar 2024 17:10), Max: 36.6 (28 Mar 2024 17:10)  T(F): 97.8 (28 Mar 2024 17:10), Max: 97.8 (28 Mar 2024 17:10)  HR: 91 (28 Mar 2024 17:10) (86 - 91)  BP: 122/87 (28 Mar 2024 17:10) (122/87 - 140/91)  BP(mean): --  RR: 18 (28 Mar 2024 17:10) (18 - 20)  SpO2: 99% (28 Mar 2024 17:10) (97% - 99%)    Parameters below as of 28 Mar 2024 12:58  Patient On (Oxygen Delivery Method): room air        PHYSICAL EXAMINATION:    GENERAL: The patient is awake and alert in no apparent distress.     HEENT: Head is normocephalic and atraumatic.    NECK: no JVD    LUNGS: Clear to auscultation without wheezing, rales or rhonchi; respirations unlabored    HEART: Regular rate and rhythm without murmur.    ABDOMEN: Soft, nontender, and nondistended.      EXTREMITIES: Without any cyanosis, clubbing, rash, lesions or edema.    NEUROLOGIC: Grossly intact.    SKIN: No ulceration or induration present.      LABS:                        16.0   10.52 )-----------( 242      ( 28 Mar 2024 13:30 )             45.0         136  |  101  |  31<H>  ----------------------------<  679<HH>  4.2   |  26  |  1.80<H>    Ca    9.6      28 Mar 2024 13:30  Phos  4.3       Mg     2.6         TPro  8.0  /  Alb  4.2  /  TBili  1.4<H>  /  DBili  x   /  AST  25  /  ALT  44  /  AlkPhos  117        Urinalysis Basic - ( 28 Mar 2024 14:08 )    Color: Yellow / Appearance: Clear / S.029 / pH: x  Gluc: x / Ketone: Trace mg/dL  / Bili: Negative / Urobili: 0.2 mg/dL   Blood: x / Protein: Negative mg/dL / Nitrite: Negative   Leuk Esterase: Trace / RBC: 0 /HPF / WBC 12 /HPF   Sq Epi: x / Non Sq Epi: x / Bacteria: Occasional /HPF                Lactate, Blood: 1.9 mmol/L (24 @ 13:58)          Assessment:    Persistent cough - etiology uncertain  New onset Diabetes  Acute Kidney Injury  Hx GERD  Hx CVA x 2      Plan:    Duoneb QID  IV fluids  Insulin as needed  Continue Protonix

## 2024-03-28 NOTE — H&P ADULT - ATTENDING COMMENTS
57yo M with a PMH of CVA, HTN, Gout, HLD, GERD who presents to the ED with weakness and polyuria.    Pt started on insulin, Endo consulted, IVF, monitor anion gap closely, on admission aniongap was within normal limits.  Hold off on further steroids/prednisone for COPD, as they may have caused sugar spike.  F/u on HbA1c.    Don Juares, Attending Physician

## 2024-03-28 NOTE — ED PROVIDER NOTE - CARE PLAN
Principal Discharge DX:	Diabetes mellitus, new onset  Secondary Diagnosis:	Steroid-induced hyperglycemia   1

## 2024-03-28 NOTE — H&P ADULT - NSHPSOCIALHISTORY_GEN_ALL_CORE
Lives: At home with wife   ADLs: independent   Alcohol Use: none   Tobacco Use: never   Recreational Drug Use: none

## 2024-03-28 NOTE — ED ADULT TRIAGE NOTE - CHIEF COMPLAINT QUOTE
ambulatory to triage.  several complaints, first being stroke like symptoms when he awoke on Monday with slurred speech.  no weakness to either extremity, just speech. no medical treatment.  then started to have increase voiding / thirst, vomiting, no aperitive with weight loss.  saw Jamel today, fingerstick was 545 and ER evaluation ordered.  currently registering "HI".   Patient is tired, throat pain (has had cough since January)  he states he is hungry now (encouraged NPO) .

## 2024-03-28 NOTE — H&P ADULT - PROBLEM SELECTOR PLAN 6
Chronic   - BP on admission 140/91  - On home lisinopril - HCTZ  - Would switch to ARB in setting of persistent cough   - Monitor routine hemodynamics Chronic   - BP on admission 140/91  - On home lisinopril - HCTZ  - Would switch to ARB in setting of persistent cough when KEREN cleared   - Continue HCTZ 25 daily   - Monitor routine hemodynamics Chronic   - BP on admission 140/91  - On home lisinopril - HCTZ  - Would switch to ARB in setting of persistent cough when KEREN cleared   - Hold losartan - HCTZ in setting of KEREN  - Monitor routine hemodynamics

## 2024-03-28 NOTE — H&P ADULT - NSHPPHYSICALEXAM_GEN_ALL_CORE
T(C): 35.9 (03-28-24 @ 12:58), Max: 35.9 (03-28-24 @ 12:58)  HR: 86 (03-28-24 @ 12:58) (86 - 86)  BP: 140/91 (03-28-24 @ 12:58) (140/91 - 140/91)  RR: 20 (03-28-24 @ 12:58) (20 - 20)  SpO2: 97% (03-28-24 @ 12:58) (97% - 97%)    GENERAL: NAD  EYES: sclera clear, no exudates  ENMT: oropharynx clear without erythema, no exudates, moist mucous membranes  NECK: supple, soft, no thyromegaly noted  LUNGS: good air entry bilaterally, clear to auscultation, symmetric breath sounds, no wheezing or rhonchi appreciated  HEART: soft S1/S2, regular rate and rhythm, no murmurs noted, no lower extremity edema  GASTROINTESTINAL: +mild distention, abdomen is soft, nontender, normoactive bowel sounds, no palpable masses  INTEGUMENT: good skin turgor, warm skin, appears well perfused  MUSCULOSKELETAL: no clubbing or cyanosis, no obvious deformity  NEUROLOGIC: awake, alert, oriented x3, good muscle tone in 4 extremities, no obvious sensory deficits

## 2024-03-28 NOTE — H&P ADULT - PROBLEM SELECTOR PLAN 2
Patient had KEREN on admission likely 2/2 [facility]   - UA: pH 6.5, trace ketone, trace leuk esterase, wbc 12, occasional bacteria, glucose >1000  - BUN/Cr 31/1.80  - Start continuous IVF   - Diabetes regimen for blood sugar control   - Avoid nephrotoxic meds  - Monitor daily CMP

## 2024-03-28 NOTE — ED ADULT NURSE NOTE - OBJECTIVE STATEMENT
patient presenting to ED after weeks of a cough and FS of >500 in doctors office today.  patient awake, alert and speaking full sentences.

## 2024-03-28 NOTE — ED ADULT NURSE REASSESSMENT NOTE - NS ED NURSE REASSESS COMMENT FT1
Pt noted resting comfortably in bed, NAD at this time. 1st attempt report at 2010 hrs. BGL stabilizing. Pt being placed on bedside monitor for remote tele order.

## 2024-03-28 NOTE — CONSULT NOTE ADULT - ASSESSMENT
58 year old male past medical history of CVA x 2 sp TPA ten years ago, htn, hld, gout, presenting from home with increased urination, thirst, ten pound weight loss decreased appetite. IN ed found with gluocse > 600 with elevated troponin. Cardiology consultation now being obtained.    Presenting with newly diagnosed DM , elevated troponin, KEREN   Ekg Sinus tachycardia 104   repeat ekg when rates normalize   right chest atypical reproducible chest pain tender to touch appears musculoskeletal in nature   admit to tele   outpatient holter to monitor for occult afib given two prior cva   mild troponin leak in setting of hyperglycemia glucose > 600 , KEREN cr 1.8   check baseline echo   trend troponin to peak   Should be on ASA 81mg given two prior CVA  check full lipid, hgba1c  continue home statin     no sign volume overload , agree with IVF for hyperglycemia and KEREN   check echo     bp 140/91 on home ace with complaints of cough x 1 year possibly secondary to ace  DC Ace and change to Losartan    fu endocrine consult  no anion gap noted  check hgba1c, tft, lipid    Ct head no acute hemorrhage  consider MRI given paresthesias  , neuro Consult ?  asa and statin for two prior CVA    further alicia pending clinical course and results of above  Monitor and replete electrolytes. Keep K>4.0 and Mg>2.0.  Sheryl Yung FNP-C  Cardiology NP  call teams          58 year old male past medical history of CVA x 2 sp TPA ten years ago, htn, hld, gout, presenting from home with increased urination, thirst, ten pound weight loss decreased appetite. IN ed found with gluocse > 600 with elevated troponin. Cardiology consultation now being obtained.    Presenting with newly diagnosed DM , elevated troponin, KEREN   Ekg Sinus tachycardia 104   repeat ekg when rates normalize   right chest atypical reproducible chest pain tender to touch appears musculoskeletal in nature   admit to tele   outpatient holter to monitor for occult afib given two prior cva   mild troponin leak in setting of hyperglycemia glucose > 600 , KEREN cr 1.8   check baseline echo   trend troponin to peak   Should be on ASA 81mg given two prior CVA  check full lipid, hgba1c  continue home statin     no sign volume overload , agree with IVF for hyperglycemia and KEREN   check echo     bp 140/91 on home ace with complaints of cough x 1 year possibly secondary to ace  DC Ace and change to Losartan    fu endocrine consult  no anion gap noted  check hgba1c, tft, lipid    Ct head no acute hemorrhage  consider MRI given paresthesias  , neuro Consult ?  asa and statin for two prior CVA    further plan pending clinical course and results of above  Monitor and replete electrolytes. Keep K>4.0 and Mg>2.0.    Sheryl Yung FNP-C  Cardiology NP  call teams

## 2024-03-28 NOTE — ED ADULT NURSE NOTE - ED STAT RN HANDOFF DETAILS
Report given to SORAYA Benitez on 1E, pt is remote tele to be transported by transport and RN. VS stable.

## 2024-03-28 NOTE — ED ADULT NURSE NOTE - NSFALLUNIVINTERV_ED_ALL_ED
Bed/Stretcher in lowest position, wheels locked, appropriate side rails in place/Call bell, personal items and telephone in reach/Instruct patient to call for assistance before getting out of bed/chair/stretcher/Non-slip footwear applied when patient is off stretcher/[place] to call system/Physically safe environment - no spills, clutter or unnecessary equipment/Purposeful proactive rounding/Room/bathroom lighting operational, light cord in reach

## 2024-03-28 NOTE — ED PROVIDER NOTE - PHYSICAL EXAMINATION
GENERAL: no acute distress; well-developed  HEAD:  Atraumatic, Normocephalic  EYES: EOMI, PERRLA, conjunctiva and sclera clear  ENT: dry MM; oropharynx clear  NECK: Supple, No JVD  CHEST/LUNG: Clear to auscultation bilaterally; No wheeze  HEART: Regular rate and rhythm; No murmurs, rubs, or gallops  ABDOMEN: Soft, Nontender, Nondistended; Bowel sounds present  EXTREMITIES:  2+ Peripheral Pulses, No clubbing, cyanosis, or edema  PSYCH: AAOx3  NEUROLOGY: no focal motor or sensory deficits. 5/5 muscle strength in all extremities.   SKIN: No rashes or lesions GENERAL: no acute distress; well-developed  HEAD:  Atraumatic, Normocephalic  EYES: EOMI, PERRLA, conjunctiva and sclera clear  ENT: dry MM; oropharynx clear  NECK: Supple, No JVD  CHEST/LUNG: Clear to auscultation bilaterally; No wheeze  HEART: Regular rate and rhythm; No murmurs, rubs, or gallops  ABDOMEN: Soft, Nontender, mild distension; Bowel sounds present  EXTREMITIES:  2+ Peripheral Pulses, No clubbing, cyanosis, or edema  PSYCH: AAOx3  NEUROLOGY: no focal motor or sensory deficits. 5/5 muscle strength in all extremities.   SKIN: No rashes or lesions

## 2024-03-28 NOTE — H&P ADULT - HISTORY OF PRESENT ILLNESS
Patient is  59yo M with a PMH of CVA and HTN who presents to the ED with weakness and polyurea.     Denies fever, chills, chest pain, palpitations, SOB, cough, abdominal pain, nausea, vomiting, diarrhea, constipation, hematochezia, melena, urinary frequency, urgency, dysuria, hematuria, headaches, changes in vision, dizziness, numbness, tingling. No other complaints at this time.    Denies recent travel, recent antibiotic use, or sick contacts.    ED course:  Vitals: /91, HR 86, T 96.6, RR 20 SpO2 97% on RA   Labs significant for: Wbc 10.52, Glucose 679 --> 440, BUN/Cr 31/1.80, Bilirubin 1.4, eGFR 43, Lipase 154, Acetone small, Trop 96.4  VBG: pH 7.27, pCO2 55, pO2 36, HCO3 25, Oxygen Saturation 60.7  UA: pH 6.5, trace ketone, trace leuk esterase, ebc 12, occasional bacteria, glucose >1000  EKbpm, QTc 420, sinus tachy  In ED given 10u Insulin x1, 8u Insulin x1, 1L NaCl bolus x2    Imaging  CT Head: No acute intracranial  hemorrhage, mass effect or midline shift. Generalized volume loss. Mild paranasal sinusitis.  CT a/p: No evidence of acute inflammatory or obstructive process in   the abdomen and pelvis. Patient is  57yo M with a PMH of CVA, HTN, Gout, HLD, GERD who presents to the ED with weakness and polyuria. Patient notes that this past Monday, he began experiencing some chest pain, increased urinary frequency, decreased PO intake, dizziness weakness, vomiting, slurred speech, lock jaw and dizziness. His symptoms have gotten progressively worse throughout the week. This AM he saw his PCP who did a finger stick. Patients blood glucose was >500 in the PCP office. Patient arrived to the ED, and his blood glucose was >600. Patient has no personal history of diabetes, but  significant family history of diabetes in both his parents. Of note, patient was recently started on a 5 day course of Prednisone 20mg BID on 3/22 for a persistent cough. The cough has been going on for months and causes the patient to have some reproducible chest pain. Patient is on Lisinopril for BP.      ED course:  Vitals: /91, HR 86, T 96.6, RR 20 SpO2 97% on RA   Labs significant for: Wbc 10.52, Glucose 679 --> 440, BUN/Cr 31/1.80, Bilirubin 1.4, eGFR 43, Lipase 154, Acetone small, Trop 96.4  VBG: pH 7.27, pCO2 55, pO2 36, HCO3 25, Oxygen Saturation 60.7  UA: pH 6.5, trace ketone, trace leuk esterase, wbc 12, occasional bacteria, glucose >1000  EKbpm, QTc 420, sinus tachy  In ED given 10u Insulin x1, 8u Insulin x1, 1L NaCl bolus x2    Imaging  CT Head: No acute intracranial  hemorrhage, mass effect or midline shift. Generalized volume loss. Mild paranasal sinusitis.  CT a/p: No evidence of acute inflammatory or obstructive process in   the abdomen and pelvis.

## 2024-03-28 NOTE — H&P ADULT - NSHPREVIEWOFSYSTEMS_GEN_ALL_CORE
GENERAL: +dizziness, +weakness, No fever or chills  EYES: no change in vision   HEENT: no trouble swallowing or speaking   CARDIAC: +reproducible chest pain   PULMONARY: +cough   GI:  No abdominal pain  : +increased urinary frequency, no dysuria    SKIN: no rashes   NEURO: no headache   MSK: No joint pain     All other ROS negative unless otherwise specified in HPI.

## 2024-03-28 NOTE — PATIENT PROFILE ADULT - NSPROHMDIABETMGMTSTRAT_GEN_A_NUR
Patient stated he just found out that he is hyperglycemic and a new onset diabetes. He does not check blood sugars because he never knew he was a diabetic until now.

## 2024-03-29 LAB
A1C WITH ESTIMATED AVERAGE GLUCOSE RESULT: 11.4 % — HIGH (ref 4–5.6)
A1C WITH ESTIMATED AVERAGE GLUCOSE RESULT: 12.1 % — HIGH (ref 4–5.6)
ALBUMIN SERPL ELPH-MCNC: 3.5 G/DL — SIGNIFICANT CHANGE UP (ref 3.3–5)
ALP SERPL-CCNC: 89 U/L — SIGNIFICANT CHANGE UP (ref 40–120)
ALT FLD-CCNC: 57 U/L — SIGNIFICANT CHANGE UP (ref 12–78)
ANION GAP SERPL CALC-SCNC: 5 MMOL/L — SIGNIFICANT CHANGE UP (ref 5–17)
AST SERPL-CCNC: 51 U/L — HIGH (ref 15–37)
BASOPHILS # BLD AUTO: 0.04 K/UL — SIGNIFICANT CHANGE UP (ref 0–0.2)
BASOPHILS NFR BLD AUTO: 0.6 % — SIGNIFICANT CHANGE UP (ref 0–2)
BILIRUB SERPL-MCNC: 1 MG/DL — SIGNIFICANT CHANGE UP (ref 0.2–1.2)
BUN SERPL-MCNC: 23 MG/DL — SIGNIFICANT CHANGE UP (ref 7–23)
CALCIUM SERPL-MCNC: 8.7 MG/DL — SIGNIFICANT CHANGE UP (ref 8.5–10.1)
CHLORIDE SERPL-SCNC: 109 MMOL/L — HIGH (ref 96–108)
CHOLEST SERPL-MCNC: 172 MG/DL — SIGNIFICANT CHANGE UP
CO2 SERPL-SCNC: 27 MMOL/L — SIGNIFICANT CHANGE UP (ref 22–31)
CREAT SERPL-MCNC: 1.3 MG/DL — SIGNIFICANT CHANGE UP (ref 0.5–1.3)
EGFR: 64 ML/MIN/1.73M2 — SIGNIFICANT CHANGE UP
EOSINOPHIL # BLD AUTO: 0.14 K/UL — SIGNIFICANT CHANGE UP (ref 0–0.5)
EOSINOPHIL NFR BLD AUTO: 1.9 % — SIGNIFICANT CHANGE UP (ref 0–6)
ESTIMATED AVERAGE GLUCOSE: 280 MG/DL — HIGH (ref 68–114)
ESTIMATED AVERAGE GLUCOSE: 301 MG/DL — HIGH (ref 68–114)
GLUCOSE SERPL-MCNC: 325 MG/DL — HIGH (ref 70–99)
HCT VFR BLD CALC: 39.6 % — SIGNIFICANT CHANGE UP (ref 39–50)
HDLC SERPL-MCNC: 25 MG/DL — LOW
HGB BLD-MCNC: 14.4 G/DL — SIGNIFICANT CHANGE UP (ref 13–17)
IMM GRANULOCYTES NFR BLD AUTO: 0.4 % — SIGNIFICANT CHANGE UP (ref 0–0.9)
LIPID PNL WITH DIRECT LDL SERPL: 65 MG/DL — SIGNIFICANT CHANGE UP
LYMPHOCYTES # BLD AUTO: 2.61 K/UL — SIGNIFICANT CHANGE UP (ref 1–3.3)
LYMPHOCYTES # BLD AUTO: 36 % — SIGNIFICANT CHANGE UP (ref 13–44)
MCHC RBC-ENTMCNC: 32.4 PG — SIGNIFICANT CHANGE UP (ref 27–34)
MCHC RBC-ENTMCNC: 36.4 GM/DL — HIGH (ref 32–36)
MCV RBC AUTO: 89 FL — SIGNIFICANT CHANGE UP (ref 80–100)
MONOCYTES # BLD AUTO: 0.48 K/UL — SIGNIFICANT CHANGE UP (ref 0–0.9)
MONOCYTES NFR BLD AUTO: 6.6 % — SIGNIFICANT CHANGE UP (ref 2–14)
NEUTROPHILS # BLD AUTO: 3.95 K/UL — SIGNIFICANT CHANGE UP (ref 1.8–7.4)
NEUTROPHILS NFR BLD AUTO: 54.5 % — SIGNIFICANT CHANGE UP (ref 43–77)
NON HDL CHOLESTEROL: 147 MG/DL — HIGH
NRBC # BLD: 0 /100 WBCS — SIGNIFICANT CHANGE UP (ref 0–0)
PLATELET # BLD AUTO: 195 K/UL — SIGNIFICANT CHANGE UP (ref 150–400)
POTASSIUM SERPL-MCNC: 3.6 MMOL/L — SIGNIFICANT CHANGE UP (ref 3.5–5.3)
POTASSIUM SERPL-SCNC: 3.6 MMOL/L — SIGNIFICANT CHANGE UP (ref 3.5–5.3)
PROT SERPL-MCNC: 6.4 G/DL — SIGNIFICANT CHANGE UP (ref 6–8.3)
RBC # BLD: 4.45 M/UL — SIGNIFICANT CHANGE UP (ref 4.2–5.8)
RBC # FLD: 13.9 % — SIGNIFICANT CHANGE UP (ref 10.3–14.5)
SODIUM SERPL-SCNC: 141 MMOL/L — SIGNIFICANT CHANGE UP (ref 135–145)
TRIGL SERPL-MCNC: 533 MG/DL — HIGH
TROPONIN I, HIGH SENSITIVITY RESULT: 108.1 NG/L — HIGH
WBC # BLD: 7.25 K/UL — SIGNIFICANT CHANGE UP (ref 3.8–10.5)
WBC # FLD AUTO: 7.25 K/UL — SIGNIFICANT CHANGE UP (ref 3.8–10.5)

## 2024-03-29 PROCEDURE — 99233 SBSQ HOSP IP/OBS HIGH 50: CPT

## 2024-03-29 PROCEDURE — 99232 SBSQ HOSP IP/OBS MODERATE 35: CPT

## 2024-03-29 PROCEDURE — 93010 ELECTROCARDIOGRAM REPORT: CPT

## 2024-03-29 PROCEDURE — 93306 TTE W/DOPPLER COMPLETE: CPT | Mod: 26

## 2024-03-29 RX ORDER — ALBUTEROL 90 UG/1
2 AEROSOL, METERED ORAL EVERY 4 HOURS
Refills: 0 | Status: DISCONTINUED | OUTPATIENT
Start: 2024-03-29 | End: 2024-03-30

## 2024-03-29 RX ORDER — PANTOPRAZOLE SODIUM 20 MG/1
40 TABLET, DELAYED RELEASE ORAL
Refills: 0 | Status: DISCONTINUED | OUTPATIENT
Start: 2024-03-29 | End: 2024-03-30

## 2024-03-29 RX ORDER — INSULIN LISPRO 100/ML
4 VIAL (ML) SUBCUTANEOUS
Refills: 0 | Status: DISCONTINUED | OUTPATIENT
Start: 2024-03-29 | End: 2024-03-30

## 2024-03-29 RX ORDER — INSULIN GLARGINE 100 [IU]/ML
14 INJECTION, SOLUTION SUBCUTANEOUS AT BEDTIME
Refills: 0 | Status: DISCONTINUED | OUTPATIENT
Start: 2024-03-29 | End: 2024-03-30

## 2024-03-29 RX ORDER — SODIUM CHLORIDE 9 MG/ML
1000 INJECTION INTRAMUSCULAR; INTRAVENOUS; SUBCUTANEOUS
Refills: 0 | Status: DISCONTINUED | OUTPATIENT
Start: 2024-03-29 | End: 2024-03-30

## 2024-03-29 RX ORDER — ATORVASTATIN CALCIUM 80 MG/1
80 TABLET, FILM COATED ORAL AT BEDTIME
Refills: 0 | Status: DISCONTINUED | OUTPATIENT
Start: 2024-03-29 | End: 2024-03-30

## 2024-03-29 RX ORDER — OMEGA-3 ACID ETHYL ESTERS 1 G
4 CAPSULE ORAL DAILY
Refills: 0 | Status: DISCONTINUED | OUTPATIENT
Start: 2024-03-29 | End: 2024-03-30

## 2024-03-29 RX ORDER — GUAIFENESIN/DEXTROMETHORPHAN 600MG-30MG
10 TABLET, EXTENDED RELEASE 12 HR ORAL EVERY 4 HOURS
Refills: 0 | Status: DISCONTINUED | OUTPATIENT
Start: 2024-03-29 | End: 2024-03-30

## 2024-03-29 RX ORDER — POTASSIUM CHLORIDE 20 MEQ
40 PACKET (EA) ORAL ONCE
Refills: 0 | Status: COMPLETED | OUTPATIENT
Start: 2024-03-29 | End: 2024-03-29

## 2024-03-29 RX ADMIN — Medication 4: at 17:13

## 2024-03-29 RX ADMIN — Medication 6: at 23:03

## 2024-03-29 RX ADMIN — Medication 10: at 11:58

## 2024-03-29 RX ADMIN — Medication 3 MILLIGRAM(S): at 22:21

## 2024-03-29 RX ADMIN — SODIUM CHLORIDE 125 MILLILITER(S): 9 INJECTION INTRAMUSCULAR; INTRAVENOUS; SUBCUTANEOUS at 08:23

## 2024-03-29 RX ADMIN — Medication 200 MILLIGRAM(S): at 06:12

## 2024-03-29 RX ADMIN — ATORVASTATIN CALCIUM 80 MILLIGRAM(S): 80 TABLET, FILM COATED ORAL at 22:20

## 2024-03-29 RX ADMIN — PANTOPRAZOLE SODIUM 40 MILLIGRAM(S): 20 TABLET, DELAYED RELEASE ORAL at 06:12

## 2024-03-29 RX ADMIN — INSULIN GLARGINE 14 UNIT(S): 100 INJECTION, SOLUTION SUBCUTANEOUS at 23:02

## 2024-03-29 RX ADMIN — Medication 100 MILLIGRAM(S): at 22:20

## 2024-03-29 RX ADMIN — HEPARIN SODIUM 5000 UNIT(S): 5000 INJECTION INTRAVENOUS; SUBCUTANEOUS at 06:12

## 2024-03-29 RX ADMIN — PANTOPRAZOLE SODIUM 40 MILLIGRAM(S): 20 TABLET, DELAYED RELEASE ORAL at 18:33

## 2024-03-29 RX ADMIN — Medication 100 MILLIGRAM(S): at 06:12

## 2024-03-29 RX ADMIN — Medication 100 MILLIGRAM(S): at 14:26

## 2024-03-29 RX ADMIN — Medication 4 UNIT(S): at 17:14

## 2024-03-29 RX ADMIN — Medication 4 GRAM(S): at 18:34

## 2024-03-29 RX ADMIN — Medication 3 MILLILITER(S): at 01:05

## 2024-03-29 RX ADMIN — Medication 200 MILLIGRAM(S): at 11:59

## 2024-03-29 RX ADMIN — Medication 8: at 07:44

## 2024-03-29 RX ADMIN — Medication 50 MILLIGRAM(S): at 11:59

## 2024-03-29 RX ADMIN — Medication 40 MILLIEQUIVALENT(S): at 18:34

## 2024-03-29 RX ADMIN — Medication 3 MILLILITER(S): at 05:45

## 2024-03-29 RX ADMIN — MONTELUKAST 10 MILLIGRAM(S): 4 TABLET, CHEWABLE ORAL at 11:59

## 2024-03-29 RX ADMIN — Medication 145 MILLIGRAM(S): at 11:59

## 2024-03-29 RX ADMIN — Medication 4 UNIT(S): at 11:59

## 2024-03-29 RX ADMIN — Medication 200 MILLIGRAM(S): at 18:34

## 2024-03-29 NOTE — PROGRESS NOTE ADULT - PROBLEM SELECTOR PLAN 3
Elevated trops on admission, likely 2/2 [facility]   - EKbpm, QTc 420, sinus tachy  - Trop 96.4 --> 117.8, trending down   - F/u baseline TTE :   CONCLUSIONS:      1. Technically difficult image quality.   2. Left ventricular systolic function is normal with an ejection fraction of 55 % by Jones's method of disks.   3. Normal left ventricular diastolic function.   4. Normal right ventricular cavity size and probably normal systolic function.   5. Trace mitral regurgitation.   6. Mild tricuspid regurgitation.   7. Estimated pulmonary artery systolic pressure is 22 mmHg, consistent with normal pulmonary artery pressure.  - Start ASA due to hx prior CVA   - Continue fenofibrate   - Cardio consulted, f/u recs

## 2024-03-29 NOTE — CARE COORDINATION ASSESSMENT. - NSPASTMEDSURGHISTORY_GEN_ALL_CORE_FT
PAST MEDICAL & SURGICAL HISTORY:  Stroke      HTN (hypertension)      Gout      HLD (hyperlipidemia)

## 2024-03-29 NOTE — PROGRESS NOTE ADULT - SUBJECTIVE AND OBJECTIVE BOX
Patient is a 58y old  Male who presents with a chief complaint of HHS (29 Mar 2024 07:41)      Subjective:  INTERVAL HPI/OVERNIGHT EVENTS: Patient seen and examined at bedside.  Patient states he is feeling better.    MEDICATIONS  (STANDING):  allopurinol 50 milliGRAM(s) Oral daily  benzonatate 100 milliGRAM(s) Oral three times a day  dextrose 5%. 1000 milliLiter(s) (50 mL/Hr) IV Continuous <Continuous>  dextrose 5%. 1000 milliLiter(s) (100 mL/Hr) IV Continuous <Continuous>  dextrose 50% Injectable 12.5 Gram(s) IV Push once  dextrose 50% Injectable 25 Gram(s) IV Push once  dextrose 50% Injectable 25 Gram(s) IV Push once  fenofibrate Tablet 145 milliGRAM(s) Oral daily  glucagon  Injectable 1 milliGRAM(s) IntraMuscular once  guaiFENesin Oral Liquid (Sugar-Free) 200 milliGRAM(s) Oral every 6 hours  heparin   Injectable 5000 Unit(s) SubCutaneous every 12 hours  insulin glargine Injectable (LANTUS) 12 Unit(s) SubCutaneous at bedtime  insulin lispro (ADMELOG) corrective regimen sliding scale   SubCutaneous at bedtime  insulin lispro (ADMELOG) corrective regimen sliding scale   SubCutaneous three times a day before meals  insulin lispro Injectable (ADMELOG) 4 Unit(s) SubCutaneous before dinner  insulin lispro Injectable (ADMELOG) 4 Unit(s) SubCutaneous before lunch  insulin lispro Injectable (ADMELOG) 4 Unit(s) SubCutaneous before breakfast  montelukast 10 milliGRAM(s) Oral daily  omega-3-Acid Ethyl Esters 4 Gram(s) Oral daily  pantoprazole    Tablet 40 milliGRAM(s) Oral two times a day  potassium chloride    Tablet ER 40 milliEquivalent(s) Oral once  sodium chloride 0.9%. 1000 milliLiter(s) (125 mL/Hr) IV Continuous <Continuous>    MEDICATIONS  (PRN):  acetaminophen     Tablet .. 650 milliGRAM(s) Oral every 6 hours PRN Temp greater or equal to 38C (100.4F), Mild Pain (1 - 3)  albuterol    90 MICROgram(s) HFA Inhaler 2 Puff(s) Inhalation every 4 hours PRN Shortness of Breath and/or Wheezing  aluminum hydroxide/magnesium hydroxide/simethicone Suspension 30 milliLiter(s) Oral every 4 hours PRN Dyspepsia  dextrose Oral Gel 15 Gram(s) Oral once PRN Blood Glucose LESS THAN 70 milliGRAM(s)/deciliter  melatonin 3 milliGRAM(s) Oral at bedtime PRN Insomnia  ondansetron Injectable 4 milliGRAM(s) IV Push every 8 hours PRN Nausea and/or Vomiting      Allergies    No Known Allergies    Intolerances        REVIEW OF SYSTEMS:  CONSTITUTIONAL: No fever or chills  HEENT:  No headache, no sore throat  RESPIRATORY: No cough or shortness of breath  CARDIOVASCULAR: No chest pain or palpitations  GASTROINTESTINAL: No abd pain, nausea, vomiting, or diarrhea      Objective:  Vital Signs Last 24 Hrs  T(C): 36.4 (29 Mar 2024 12:54), Max: 36.8 (28 Mar 2024 23:11)  T(F): 97.6 (29 Mar 2024 12:54), Max: 98.3 (28 Mar 2024 23:11)  HR: 92 (29 Mar 2024 12:54) (3 - 98)  BP: 131/87 (29 Mar 2024 12:54) (122/87 - 147/98)  BP(mean): 101 (28 Mar 2024 23:11) (101 - 101)  RR: 18 (29 Mar 2024 12:54) (18 - 19)  SpO2: 97% (29 Mar 2024 12:54) (94% - 99%)    Parameters below as of 29 Mar 2024 12:54  Patient On (Oxygen Delivery Method): room air        GENERAL: NAD, lying in bed comfortably  HEAD:  Normocephalic  EYES:  conjunctiva and sclera clear  ENT: Moist mucous membranes  NECK: Supple  CHEST/LUNG: Clear to auscultation bilaterally; No rales or rhonchi; no wheezing. Unlabored respirations  HEART: Regular rate and rhythm; S1S2+  ABDOMEN: Bowel sounds present; Soft, epigastric discomfort upon palpation, Nondistended.   EXTREMITIES:  + distal Peripheral Pulses;  No cyanosis, or edema  NERVOUS SYSTEM:  Alert & Oriented X3;  No gross focal deficits   MSK: moves all extremities  SKIN: No rashes     LABS:                        14.4   7.25  )-----------( 195      ( 29 Mar 2024 06:43 )             39.6     29 Mar 2024 06:43    141    |  109    |  23     ----------------------------<  325    3.6     |  27     |  1.30     Ca    8.7        29 Mar 2024 06:43    TPro  6.4    /  Alb  3.5    /  TBili  1.0    /  DBili  x      /  AST  51     /  ALT  57     /  AlkPhos  89     29 Mar 2024 06:43      Urinalysis Basic - ( 29 Mar 2024 06:43 )    Color: x / Appearance: x / SG: x / pH: x  Gluc: 325 mg/dL / Ketone: x  / Bili: x / Urobili: x   Blood: x / Protein: x / Nitrite: x   Leuk Esterase: x / RBC: x / WBC x   Sq Epi: x / Non Sq Epi: x / Bacteria: x      CAPILLARY BLOOD GLUCOSE      POCT Blood Glucose.: 358 mg/dL (29 Mar 2024 11:20)  POCT Blood Glucose.: 333 mg/dL (29 Mar 2024 07:16)  POCT Blood Glucose.: 361 mg/dL (28 Mar 2024 22:19)  POCT Blood Glucose.: 294 mg/dL (28 Mar 2024 20:08)  POCT Blood Glucose.: 440 mg/dL (28 Mar 2024 15:45)          RADIOLOGY & ADDITIONAL TESTS:    Personally reviewed.     Consultant(s) Notes Reviewed:  [x] YES  [ ] NO    Plan of care discussed with patient,  all questions answered

## 2024-03-29 NOTE — PROGRESS NOTE ADULT - PROBLEM SELECTOR PLAN 4
Two prior TIA's in the past.  - CT Head: No acute intracranial  hemorrhage, mass effect or midline shift. Generalized volume loss. Mild paranasal sinusitis  - Start ASA   - Continue fenofibrate    - add lovaza

## 2024-03-29 NOTE — PROGRESS NOTE ADULT - PROBLEM SELECTOR PLAN 1
Glucose on admission 679. No personal history of DM.   - Sugars likely exacerbated by recent Prednisone use   - CT a/p: No evidence of acute inflammatory or obstructive process in the abdomen and pelvis.  - UA: pH 6.5, trace ketone, trace leuk esterase, wbc 12, occasional bacteria, glucose >1000  - VBG: pH 7.27, pCO2 55, pO2 36, HCO3 25, Oxygen Saturation 60.7  - S/p 10u Insulin x1, 8u Insulin x1, 2L NaCl bolus   - Glucose 679 --> 440  - Anion gap 9  - Acetone small  - Start continuous IVF   - Started Lantus 12u  - Start MDISS  - start 4units premeal coverage   - Hypoglycemia protocol   - Monitor fingersticks   - A1c 11.4/12.1  - normal  anion gap - + Beta hydroxybutyrate   - Hold home prednisone  - Endo Dr. Perlman consulted, f/u recs  - DM team consult

## 2024-03-29 NOTE — CARE COORDINATION ASSESSMENT. - NSCAREPROVIDERS_GEN_ALL_CORE_FT
CARE PROVIDERS:  Accepting Physician: Don Juares  Access Services: Javan Murdock  Administration: Ez Rodriguez  Admitting: Don Juares  Attending: Don Juares  Cardiology Technician: Sahra Hassan  Case Management: Lesley Rivera  Consultant: Fede Jarrett  Consultant: Gunner Whitfield  Consultant: Chris Borden  Consultant: Keila Mena  Consultant: Odette Baugh  Consultant: Sheryl Yung  Covering Team: Fede Michael  ED Attending: Ez Morales  ED Nurse: Jigar Lucio  Nurse: Chana Mas  Nurse: Nga Lu  Ordered: Doctor, Unknown  Ordered: ADM, User  Outpatient Provider: Fede Jarrett  PCA/Nursing Assistant: Ivette Fry  PCA/Nursing Assistant: Lydia Eller  Primary Team: Lorrie Fulton  Primary Team: Mary Tamez  Primary Team: Farzaneh Mabry  Primary Team: Orville Coy  Primary Team: Jane Barajas  Registered Dietitian: Lexii Anderson  Respiratory Therapy: Krishna Zepeda  Respiratory Therapy: Jenaro Gonzales

## 2024-03-29 NOTE — PROGRESS NOTE ADULT - PROBLEM SELECTOR PLAN 6
Chronic   - BP on admission 140/91  - On home lisinopril - HCTZ  - Would switch to ARB in setting of persistent cough when KEREN cleared   - Hold losartan - HCTZ in setting of KEREN  - Monitor routine hemodynamics

## 2024-03-29 NOTE — CASE MANAGEMENT PROGRESS NOTE - NSCMPROGRESSNOTE_GEN_ALL_CORE
Patient is for possible transition home.  discussed setting up visiting nurse for diabetic teaching, patient declined stating that he has two nieces that are nurses and his wife gives him insulin. Patient understands that  remains available.

## 2024-03-29 NOTE — PROGRESS NOTE ADULT - SUBJECTIVE AND OBJECTIVE BOX
[facility] -- Michi Razo, Ronnell Gonsalez Savella, , Ortiz Baugh  Office # [phone]    Follow Up:  Elevated Troponins    Subjective/Observations:     REVIEW OF SYSTEMS: All other review of systems is negative unless indicated above  PAST MEDICAL & SURGICAL HISTORY:  HTN (hypertension)  Stroke  HLD (hyperlipidemia)  Gout    MEDICATIONS  (STANDING):  albuterol/ipratropium for Nebulization 3 milliLiter(s) Nebulizer every 4 hours  allopurinol 50 milliGRAM(s) Oral daily  benzonatate 100 milliGRAM(s) Oral three times a day  dextrose 5%. 1000 milliLiter(s) (50 mL/Hr) IV Continuous <Continuous>  dextrose 5%. 1000 milliLiter(s) (100 mL/Hr) IV Continuous <Continuous>  dextrose 50% Injectable 25 Gram(s) IV Push once  dextrose 50% Injectable 12.5 Gram(s) IV Push once  dextrose 50% Injectable 25 Gram(s) IV Push once  famotidine    Tablet 20 milliGRAM(s) Oral at bedtime  fenofibrate Tablet 145 milliGRAM(s) Oral daily  glucagon  Injectable 1 milliGRAM(s) IntraMuscular once  guaiFENesin Oral Liquid (Sugar-Free) 200 milliGRAM(s) Oral every 6 hours  heparin   Injectable 5000 Unit(s) SubCutaneous every 12 hours  insulin glargine Injectable (LANTUS) 12 Unit(s) SubCutaneous at bedtime  insulin lispro (ADMELOG) corrective regimen sliding scale   SubCutaneous at bedtime  insulin lispro (ADMELOG) corrective regimen sliding scale   SubCutaneous three times a day before meals  montelukast 10 milliGRAM(s) Oral daily  pantoprazole    Tablet 40 milliGRAM(s) Oral before breakfast  sodium chloride 0.9%. 1000 milliLiter(s) (125 mL/Hr) IV Continuous <Continuous>    MEDICATIONS  (PRN):  acetaminophen     Tablet .. 650 milliGRAM(s) Oral every 6 hours PRN Temp greater or equal to 38C (100.4F), Mild Pain (1 - 3)  aluminum hydroxide/magnesium hydroxide/simethicone Suspension 30 milliLiter(s) Oral every 4 hours PRN Dyspepsia  dextrose Oral Gel 15 Gram(s) Oral once PRN Blood Glucose LESS THAN 70 milliGRAM(s)/deciliter  melatonin 3 milliGRAM(s) Oral at bedtime PRN Insomnia  ondansetron Injectable 4 milliGRAM(s) IV Push every 8 hours PRN Nausea and/or Vomiting    Allergies    No Known Allergies    Intolerances      Vital Signs Last 24 Hrs  T(C): 36.5 (29 Mar 2024 05:37), Max: 36.8 (28 Mar 2024 23:11)  T(F): 97.7 (29 Mar 2024 05:37), Max: 98.3 (28 Mar 2024 23:11)  HR: 80 (29 Mar 2024 05:45) (80 - 98)  BP: 129/78 (29 Mar 2024 05:37) (122/87 - 147/98)  BP(mean): 101 (28 Mar 2024 23:11) (101 - 101)  RR: 18 (29 Mar 2024 05:37) (18 - 20)  SpO2: 97% (29 Mar 2024 05:45) (94% - 99%)    Parameters below as of 29 Mar 2024 05:45  Patient On (Oxygen Delivery Method): room air  I&O's Summary    Weight (kg): 76.7 (03-28 @ 12:58)  PHYSICAL EXAM:  TELE:   Constitutional: NAD, awake and alert, well-developed  HEENT: Moist Mucous Membranes, Anicteric  Pulmonary: Non-labored, breath sounds are clear bilaterally, No wheezing, rales or rhonchi  Cardiovascular: Regular, S1 and S2, No murmurs, rubs, gallops or clicks  Gastrointestinal: Bowel Sounds present, soft, nontender.   Lymph: No peripheral edema. No lymphadenopathy.  Skin: No visible rashes or ulcers.  Psych:  Mood & affect appropriate  LABS: All Labs Reviewed:                        16.0   10.52 )-----------( 242      ( 28 Mar 2024 13:30 )             45.0     28 Mar 2024 21:24    140    |  108    |  26     ----------------------------<  309    3.7     |  23     |  1.30   28 Mar 2024 13:30    136    |  101    |  31     ----------------------------<  679    4.2     |  26     |  1.80     Ca    8.2        28 Mar 2024 21:24  Ca    9.6        28 Mar 2024 13:30  Phos  4.3       28 Mar 2024 13:30  Mg     2.6       28 Mar 2024 13:30    TPro  8.0    /  Alb  4.2    /  TBili  1.4    /  DBili  x      /  AST  25     /  ALT  44     /  AlkPhos  117    28 Mar 2024 13:30  CARDIAC MARKERS ( 28 Mar 2024 21:24 )  x     / x     / 84 U/L / x     / 1.1 ng/mL    12 Lead ECG:   Ventricular Rate 104 BPM    Atrial Rate 104 BPM    P-R Interval 140 ms    QRS Duration 70 ms    Q-T Interval 320 ms    QTC Calculation(Bazett) 420 ms    P Axis 63 degrees    R Axis -21 degrees    T Axis 52 degrees    Diagnosis Line Sinus tachycardia  Otherwise normal ECG  No previous ECGs available  Confirmed by Odette Baugh ([id number]) on 3/28/2024 2:19:33 PM (03-28-24 @ 13:15)        [facility] -- Michi Razo, Ronnell Gonsalez Savella, , Ortiz Baugh  Office # [phone]    Follow Up:  Elevated Troponins    Subjective/Observations: Seen asleep but arousable to name-calling.  Non-orthopneic on RA.  Denies CP or palpitations  However, c/o feeling weak    REVIEW OF SYSTEMS: All other review of systems is negative unless indicated above  PAST MEDICAL & SURGICAL HISTORY:  HTN (hypertension)  Stroke  HLD (hyperlipidemia)  Gout    MEDICATIONS  (STANDING):  albuterol/ipratropium for Nebulization 3 milliLiter(s) Nebulizer every 4 hours  allopurinol 50 milliGRAM(s) Oral daily  benzonatate 100 milliGRAM(s) Oral three times a day  dextrose 5%. 1000 milliLiter(s) (50 mL/Hr) IV Continuous <Continuous>  dextrose 5%. 1000 milliLiter(s) (100 mL/Hr) IV Continuous <Continuous>  dextrose 50% Injectable 25 Gram(s) IV Push once  dextrose 50% Injectable 12.5 Gram(s) IV Push once  dextrose 50% Injectable 25 Gram(s) IV Push once  famotidine    Tablet 20 milliGRAM(s) Oral at bedtime  fenofibrate Tablet 145 milliGRAM(s) Oral daily  glucagon  Injectable 1 milliGRAM(s) IntraMuscular once  guaiFENesin Oral Liquid (Sugar-Free) 200 milliGRAM(s) Oral every 6 hours  heparin   Injectable 5000 Unit(s) SubCutaneous every 12 hours  insulin glargine Injectable (LANTUS) 12 Unit(s) SubCutaneous at bedtime  insulin lispro (ADMELOG) corrective regimen sliding scale   SubCutaneous at bedtime  insulin lispro (ADMELOG) corrective regimen sliding scale   SubCutaneous three times a day before meals  montelukast 10 milliGRAM(s) Oral daily  pantoprazole    Tablet 40 milliGRAM(s) Oral before breakfast  sodium chloride 0.9%. 1000 milliLiter(s) (125 mL/Hr) IV Continuous <Continuous>    MEDICATIONS  (PRN):  acetaminophen     Tablet .. 650 milliGRAM(s) Oral every 6 hours PRN Temp greater or equal to 38C (100.4F), Mild Pain (1 - 3)  aluminum hydroxide/magnesium hydroxide/simethicone Suspension 30 milliLiter(s) Oral every 4 hours PRN Dyspepsia  dextrose Oral Gel 15 Gram(s) Oral once PRN Blood Glucose LESS THAN 70 milliGRAM(s)/deciliter  melatonin 3 milliGRAM(s) Oral at bedtime PRN Insomnia  ondansetron Injectable 4 milliGRAM(s) IV Push every 8 hours PRN Nausea and/or Vomiting    Allergies    No Known Allergies    Intolerances      Vital Signs Last 24 Hrs  T(C): 36.5 (29 Mar 2024 05:37), Max: 36.8 (28 Mar 2024 23:11)  T(F): 97.7 (29 Mar 2024 05:37), Max: 98.3 (28 Mar 2024 23:11)  HR: 80 (29 Mar 2024 05:45) (80 - 98)  BP: 129/78 (29 Mar 2024 05:37) (122/87 - 147/98)  BP(mean): 101 (28 Mar 2024 23:11) (101 - 101)  RR: 18 (29 Mar 2024 05:37) (18 - 20)  SpO2: 97% (29 Mar 2024 05:45) (94% - 99%)    Parameters below as of 29 Mar 2024 05:45  Patient On (Oxygen Delivery Method): room air  I&O's Summary    Weight (kg): 76.7 (03-28 @ 12:58)  PHYSICAL EXAM:  TELE: Not on tele  Constitutional: NAD, awake and alert, well-developed  HEENT: Moist Mucous Membranes, Anicteric  Pulmonary: Non-labored, breath sounds are clear bilaterally, No wheezing, rales or rhonchi  Cardiovascular: Regular, S1 and S2, No murmurs, rubs, gallops or clicks  Gastrointestinal: Bowel Sounds present, soft, nontender.   Lymph: No peripheral edema. No lymphadenopathy.  Skin: No visible rashes or ulcers.  Psych:  Mood & affect appropriate  LABS: All Labs Reviewed:                        16.0   10.52 )-----------( 242      ( 28 Mar 2024 13:30 )             45.0     28 Mar 2024 21:24    140    |  108    |  26     ----------------------------<  309    3.7     |  23     |  1.30   28 Mar 2024 13:30    136    |  101    |  31     ----------------------------<  679    4.2     |  26     |  1.80     Ca    8.2        28 Mar 2024 21:24  Ca    9.6        28 Mar 2024 13:30  Phos  4.3       28 Mar 2024 13:30  Mg     2.6       28 Mar 2024 13:30    TPro  8.0    /  Alb  4.2    /  TBili  1.4    /  DBili  x      /  AST  25     /  ALT  44     /  AlkPhos  117    28 Mar 2024 13:30  CARDIAC MARKERS ( 28 Mar 2024 21:24 )  x     / x     / 84 U/L / x     / 1.1 ng/mL    12 Lead ECG:   Ventricular Rate 104 BPM    Atrial Rate 104 BPM    P-R Interval 140 ms    QRS Duration 70 ms    Q-T Interval 320 ms    QTC Calculation(Bazett) 420 ms    P Axis 63 degrees    R Axis -21 degrees    T Axis 52 degrees    Diagnosis Line Sinus tachycardia  Otherwise normal ECG  No previous ECGs available  Confirmed by Odette Baugh ([id number]) on 3/28/2024 2:19:33 PM (03-28-24 @ 13:15)

## 2024-03-29 NOTE — PROGRESS NOTE ADULT - PROBLEM SELECTOR PLAN 2
Chief Complaint   Patient presents with   • Office Visit   • Procedure       HPI     YAG laser posterior capsulotomy right eye. Right eye vision progressively worse.            ASSESSMENT & PLAN:  Secondary cataract of right eye  Procedure: YAG capsulotomy, Right eye     Surgeon: Marilou Mcelroy MD  Pre-operative Diagnosis:  Posterior capsular opacity, Right eye   Post-operative Diagnosis: Same as above  Consent: obtained  Anesthesia: none needed    History and Indications: Patient has a visually significant opacity behind his intraocular lens. After reviewing risks, benefits, and alternatives, patient wishes to pursue a YAG capsulotomy.    Procedure Note:  1 drop of alphagan was given  YAG Laser:  350uM retrofocus , cross pattern to create clear capsulotomy   Power: 1.6 mJ   Total power: 34 mJ   Total laser spots: 54.4       There were no complications. The patient is stable and doing well post-procedure.   Patient is to start Pred-forte four times a day for 1 week.            FOLLOW UP:  Return for 2 weeks dilate RE.     Past medical, social, and family medical histories were reviewed and can be found in Epic.    Medications, problem list, and review of systems were reviewed and can be found in Epic.     ALLERGIES:  No Known Allergies    PRIMARY PHYSICIAN:    YENIFER Lopes     A1C:    Hemoglobin A1C (%)   Date Value   05/09/2017 6.2 (H)     
Patient had KEREN on admission likely 2/2 [facility]   - UA: pH 6.5, trace ketone, trace leuk esterase, wbc 12, occasional bacteria, glucose >1000  - BUN/Cr 31/1.80->improved   - continuous IVF till FS<300  - Diabetes regimen for blood sugar control   - Avoid nephrotoxic meds  - Monitor daily CMP

## 2024-03-29 NOTE — CARE COORDINATION ASSESSMENT. - OTHER PERTINENT DISCHARGE PLANNING INFORMATION:
met with patient and his daughter Valeria at bedside to introduce self. Role of case management and transition explained. Patient and caregiver verbalized understanding. Patient is from home with family. Admitted for Type II diabetes mellitus with hyperosmolar hyperglycemic state (HHS Choice and transition  resources explained and given with CM contact information. Patient and daughter Rubi understand that  will remain available.

## 2024-03-29 NOTE — PROGRESS NOTE ADULT - NS ATTEND AMEND GEN_ALL_CORE FT
58 year old male past medical history of CVA x 2 sp TPA ten years ago, htn, hld, gout, presenting from home with increased urination, thirst, ten pound weight loss decreased appetite. IN ed found with gluocse > 600 with elevated troponin. Cardiology consultation now being obtained.    new dm  mild elev trop in setting of dm and red  echo

## 2024-03-30 VITALS
TEMPERATURE: 98 F | HEART RATE: 85 BPM | OXYGEN SATURATION: 95 % | RESPIRATION RATE: 18 BRPM | DIASTOLIC BLOOD PRESSURE: 78 MMHG | SYSTOLIC BLOOD PRESSURE: 120 MMHG

## 2024-03-30 DIAGNOSIS — R05.3 CHRONIC COUGH: ICD-10-CM

## 2024-03-30 LAB
ANION GAP SERPL CALC-SCNC: 6 MMOL/L — SIGNIFICANT CHANGE UP (ref 5–17)
BASOPHILS # BLD AUTO: 0.03 K/UL — SIGNIFICANT CHANGE UP (ref 0–0.2)
BASOPHILS NFR BLD AUTO: 0.5 % — SIGNIFICANT CHANGE UP (ref 0–2)
BUN SERPL-MCNC: 16 MG/DL — SIGNIFICANT CHANGE UP (ref 7–23)
CALCIUM SERPL-MCNC: 7.7 MG/DL — LOW (ref 8.5–10.1)
CHLORIDE SERPL-SCNC: 111 MMOL/L — HIGH (ref 96–108)
CO2 SERPL-SCNC: 25 MMOL/L — SIGNIFICANT CHANGE UP (ref 22–31)
CREAT SERPL-MCNC: 1.2 MG/DL — SIGNIFICANT CHANGE UP (ref 0.5–1.3)
EGFR: 71 ML/MIN/1.73M2 — SIGNIFICANT CHANGE UP
EOSINOPHIL # BLD AUTO: 0.18 K/UL — SIGNIFICANT CHANGE UP (ref 0–0.5)
EOSINOPHIL NFR BLD AUTO: 3.1 % — SIGNIFICANT CHANGE UP (ref 0–6)
GLUCOSE SERPL-MCNC: 256 MG/DL — HIGH (ref 70–99)
HCT VFR BLD CALC: 35.6 % — LOW (ref 39–50)
HGB BLD-MCNC: 12.8 G/DL — LOW (ref 13–17)
IMM GRANULOCYTES NFR BLD AUTO: 0.5 % — SIGNIFICANT CHANGE UP (ref 0–0.9)
LYMPHOCYTES # BLD AUTO: 1.88 K/UL — SIGNIFICANT CHANGE UP (ref 1–3.3)
LYMPHOCYTES # BLD AUTO: 32 % — SIGNIFICANT CHANGE UP (ref 13–44)
MCHC RBC-ENTMCNC: 32.3 PG — SIGNIFICANT CHANGE UP (ref 27–34)
MCHC RBC-ENTMCNC: 36 GM/DL — SIGNIFICANT CHANGE UP (ref 32–36)
MCV RBC AUTO: 89.9 FL — SIGNIFICANT CHANGE UP (ref 80–100)
MONOCYTES # BLD AUTO: 0.48 K/UL — SIGNIFICANT CHANGE UP (ref 0–0.9)
MONOCYTES NFR BLD AUTO: 8.2 % — SIGNIFICANT CHANGE UP (ref 2–14)
NEUTROPHILS # BLD AUTO: 3.27 K/UL — SIGNIFICANT CHANGE UP (ref 1.8–7.4)
NEUTROPHILS NFR BLD AUTO: 55.7 % — SIGNIFICANT CHANGE UP (ref 43–77)
NRBC # BLD: 0 /100 WBCS — SIGNIFICANT CHANGE UP (ref 0–0)
PLATELET # BLD AUTO: 162 K/UL — SIGNIFICANT CHANGE UP (ref 150–400)
POTASSIUM SERPL-MCNC: 3.7 MMOL/L — SIGNIFICANT CHANGE UP (ref 3.5–5.3)
POTASSIUM SERPL-SCNC: 3.7 MMOL/L — SIGNIFICANT CHANGE UP (ref 3.5–5.3)
RBC # BLD: 3.96 M/UL — LOW (ref 4.2–5.8)
RBC # FLD: 13.7 % — SIGNIFICANT CHANGE UP (ref 10.3–14.5)
SODIUM SERPL-SCNC: 142 MMOL/L — SIGNIFICANT CHANGE UP (ref 135–145)
WBC # BLD: 5.87 K/UL — SIGNIFICANT CHANGE UP (ref 3.8–10.5)
WBC # FLD AUTO: 5.87 K/UL — SIGNIFICANT CHANGE UP (ref 3.8–10.5)

## 2024-03-30 PROCEDURE — 83690 ASSAY OF LIPASE: CPT

## 2024-03-30 PROCEDURE — 83605 ASSAY OF LACTIC ACID: CPT

## 2024-03-30 PROCEDURE — 99285 EMERGENCY DEPT VISIT HI MDM: CPT | Mod: 25

## 2024-03-30 PROCEDURE — 85025 COMPLETE CBC W/AUTO DIFF WBC: CPT

## 2024-03-30 PROCEDURE — 74176 CT ABD & PELVIS W/O CONTRAST: CPT | Mod: MC

## 2024-03-30 PROCEDURE — 82553 CREATINE MB FRACTION: CPT

## 2024-03-30 PROCEDURE — 81001 URINALYSIS AUTO W/SCOPE: CPT

## 2024-03-30 PROCEDURE — 71045 X-RAY EXAM CHEST 1 VIEW: CPT

## 2024-03-30 PROCEDURE — 83036 HEMOGLOBIN GLYCOSYLATED A1C: CPT

## 2024-03-30 PROCEDURE — 84100 ASSAY OF PHOSPHORUS: CPT

## 2024-03-30 PROCEDURE — 80061 LIPID PANEL: CPT

## 2024-03-30 PROCEDURE — 93306 TTE W/DOPPLER COMPLETE: CPT

## 2024-03-30 PROCEDURE — 82009 KETONE BODYS QUAL: CPT

## 2024-03-30 PROCEDURE — 93005 ELECTROCARDIOGRAM TRACING: CPT

## 2024-03-30 PROCEDURE — 80053 COMPREHEN METABOLIC PANEL: CPT

## 2024-03-30 PROCEDURE — 82962 GLUCOSE BLOOD TEST: CPT

## 2024-03-30 PROCEDURE — 80048 BASIC METABOLIC PNL TOTAL CA: CPT

## 2024-03-30 PROCEDURE — 94760 N-INVAS EAR/PLS OXIMETRY 1: CPT

## 2024-03-30 PROCEDURE — 96360 HYDRATION IV INFUSION INIT: CPT

## 2024-03-30 PROCEDURE — 82550 ASSAY OF CK (CPK): CPT

## 2024-03-30 PROCEDURE — 94640 AIRWAY INHALATION TREATMENT: CPT

## 2024-03-30 PROCEDURE — 82010 KETONE BODYS QUAN: CPT

## 2024-03-30 PROCEDURE — 36415 COLL VENOUS BLD VENIPUNCTURE: CPT

## 2024-03-30 PROCEDURE — 82803 BLOOD GASES ANY COMBINATION: CPT

## 2024-03-30 PROCEDURE — 99232 SBSQ HOSP IP/OBS MODERATE 35: CPT

## 2024-03-30 PROCEDURE — 70450 CT HEAD/BRAIN W/O DYE: CPT | Mod: MC

## 2024-03-30 PROCEDURE — 99239 HOSP IP/OBS DSCHRG MGMT >30: CPT

## 2024-03-30 PROCEDURE — 83735 ASSAY OF MAGNESIUM: CPT

## 2024-03-30 PROCEDURE — 87637 SARSCOV2&INF A&B&RSV AMP PRB: CPT

## 2024-03-30 PROCEDURE — 84484 ASSAY OF TROPONIN QUANT: CPT

## 2024-03-30 RX ORDER — ALBUTEROL 90 UG/1
2 AEROSOL, METERED ORAL
Qty: 1 | Refills: 0
Start: 2024-03-30 | End: 2024-04-28

## 2024-03-30 RX ORDER — OMEPRAZOLE 10 MG/1
1 CAPSULE, DELAYED RELEASE ORAL
Refills: 0 | DISCHARGE

## 2024-03-30 RX ORDER — FAMOTIDINE 10 MG/ML
1 INJECTION INTRAVENOUS
Refills: 0 | DISCHARGE

## 2024-03-30 RX ORDER — OMEGA-3 ACID ETHYL ESTERS 1 G
4 CAPSULE ORAL
Qty: 120 | Refills: 0
Start: 2024-03-30 | End: 2024-04-28

## 2024-03-30 RX ORDER — OMEPRAZOLE 10 MG/1
1 CAPSULE, DELAYED RELEASE ORAL
Qty: 60 | Refills: 0
Start: 2024-03-30 | End: 2024-04-28

## 2024-03-30 RX ORDER — ISOPROPYL ALCOHOL, BENZOCAINE .7; .06 ML/ML; ML/ML
0 SWAB TOPICAL
Qty: 100 | Refills: 1
Start: 2024-03-30

## 2024-03-30 RX ORDER — INSULIN GLARGINE 100 [IU]/ML
16 INJECTION, SOLUTION SUBCUTANEOUS AT BEDTIME
Refills: 0 | Status: DISCONTINUED | OUTPATIENT
Start: 2024-03-30 | End: 2024-03-30

## 2024-03-30 RX ORDER — INSULIN LISPRO 100/ML
0.1 VIAL (ML) SUBCUTANEOUS
Qty: 1 | Refills: 0
Start: 2024-03-30

## 2024-03-30 RX ORDER — METFORMIN HYDROCHLORIDE 850 MG/1
500 TABLET ORAL
Refills: 0 | Status: DISCONTINUED | OUTPATIENT
Start: 2024-03-30 | End: 2024-03-30

## 2024-03-30 RX ORDER — ATORVASTATIN CALCIUM 80 MG/1
1 TABLET, FILM COATED ORAL
Qty: 0 | Refills: 0 | DISCHARGE
Start: 2024-03-30

## 2024-03-30 RX ORDER — INSULIN LISPRO 100/ML
4 VIAL (ML) SUBCUTANEOUS
Qty: 3 | Refills: 0
Start: 2024-03-30 | End: 2024-04-28

## 2024-03-30 RX ORDER — ASPIRIN/CALCIUM CARB/MAGNESIUM 324 MG
1 TABLET ORAL
Qty: 30 | Refills: 0
Start: 2024-03-30 | End: 2024-04-28

## 2024-03-30 RX ORDER — ASPIRIN/CALCIUM CARB/MAGNESIUM 324 MG
81 TABLET ORAL DAILY
Refills: 0 | Status: DISCONTINUED | OUTPATIENT
Start: 2024-03-30 | End: 2024-03-30

## 2024-03-30 RX ORDER — INSULIN GLARGINE 100 [IU]/ML
16 INJECTION, SOLUTION SUBCUTANEOUS
Qty: 1 | Refills: 0
Start: 2024-03-30 | End: 2024-04-28

## 2024-03-30 RX ORDER — LISINOPRIL/HYDROCHLOROTHIAZIDE 10-12.5 MG
1 TABLET ORAL
Refills: 0 | DISCHARGE

## 2024-03-30 RX ADMIN — HEPARIN SODIUM 5000 UNIT(S): 5000 INJECTION INTRAVENOUS; SUBCUTANEOUS at 17:56

## 2024-03-30 RX ADMIN — Medication 145 MILLIGRAM(S): at 12:28

## 2024-03-30 RX ADMIN — METFORMIN HYDROCHLORIDE 500 MILLIGRAM(S): 850 TABLET ORAL at 17:57

## 2024-03-30 RX ADMIN — Medication 100 MILLIGRAM(S): at 14:07

## 2024-03-30 RX ADMIN — Medication 10 MILLILITER(S): at 10:23

## 2024-03-30 RX ADMIN — Medication 10 MILLILITER(S): at 17:56

## 2024-03-30 RX ADMIN — PANTOPRAZOLE SODIUM 40 MILLIGRAM(S): 20 TABLET, DELAYED RELEASE ORAL at 17:57

## 2024-03-30 RX ADMIN — Medication 4 UNIT(S): at 17:26

## 2024-03-30 RX ADMIN — SODIUM CHLORIDE 125 MILLILITER(S): 9 INJECTION INTRAMUSCULAR; INTRAVENOUS; SUBCUTANEOUS at 01:02

## 2024-03-30 RX ADMIN — Medication 100 MILLIGRAM(S): at 06:50

## 2024-03-30 RX ADMIN — Medication 4 UNIT(S): at 12:27

## 2024-03-30 RX ADMIN — SODIUM CHLORIDE 125 MILLILITER(S): 9 INJECTION INTRAMUSCULAR; INTRAVENOUS; SUBCUTANEOUS at 08:29

## 2024-03-30 RX ADMIN — HEPARIN SODIUM 5000 UNIT(S): 5000 INJECTION INTRAVENOUS; SUBCUTANEOUS at 06:50

## 2024-03-30 RX ADMIN — Medication 50 MILLIGRAM(S): at 12:28

## 2024-03-30 RX ADMIN — Medication 10 MILLILITER(S): at 06:50

## 2024-03-30 RX ADMIN — Medication 4: at 12:27

## 2024-03-30 RX ADMIN — MONTELUKAST 10 MILLIGRAM(S): 4 TABLET, CHEWABLE ORAL at 12:29

## 2024-03-30 RX ADMIN — Medication 4 GRAM(S): at 12:29

## 2024-03-30 RX ADMIN — Medication 10 MILLILITER(S): at 01:01

## 2024-03-30 RX ADMIN — Medication 10: at 08:25

## 2024-03-30 RX ADMIN — Medication 4 UNIT(S): at 08:25

## 2024-03-30 RX ADMIN — Medication 10 MILLILITER(S): at 14:07

## 2024-03-30 RX ADMIN — Medication 81 MILLIGRAM(S): at 12:30

## 2024-03-30 RX ADMIN — PANTOPRAZOLE SODIUM 40 MILLIGRAM(S): 20 TABLET, DELAYED RELEASE ORAL at 06:54

## 2024-03-30 NOTE — DIETITIAN INITIAL EVALUATION ADULT - PROBLEM SELECTOR PLAN 1
Glucose on admission 679. No personal history of DM.   - Sugars likely exacerbated by recent Prednisone use   - CT a/p: No evidence of acute inflammatory or obstructive process in the abdomen and pelvis.  - UA: pH 6.5, trace ketone, trace leuk esterase, wbc 12, occasional bacteria, glucose >1000  - VBG: pH 7.27, pCO2 55, pO2 36, HCO3 25, Oxygen Saturation 60.7  - S/p 10u Insulin x1, 8u Insulin x1, 2L NaCl bolus   - Glucose 679 --> 440  - Anion gap 9  - Acetone small  - Start continuous IVF   - Start Lantus 12u  - Start MDISS  - Hypoglycemia protocol   - Monitor fingersticks   - F/u AM A1c  - F/u repeat BMP to assess for opening of anion gap   - F/u Beta hydroxybutyrate   - Hold home prednisone   - Endo Dr. Perlman consulted, f/u recs

## 2024-03-30 NOTE — DISCHARGE NOTE PROVIDER - HOSPITAL COURSE
ADMISSION DATE:  24    ---  FROM ADMISSION H+P:   HPI:  Patient is  59yo M with a PMH of CVA, HTN, Gout, HLD, GERD who presents to the ED with weakness and polyuria. Patient notes that this past Monday, he began experiencing some chest pain, increased urinary frequency, decreased PO intake, dizziness weakness, vomiting, slurred speech, lock jaw and dizziness. His symptoms have gotten progressively worse throughout the week. This AM he saw his PCP who did a finger stick. Patients blood glucose was >500 in the PCP office. Patient arrived to the ED, and his blood glucose was >600. Patient has no personal history of diabetes, but  significant family history of diabetes in both his parents. Of note, patient was recently started on a 5 day course of Prednisone 20mg BID on 3/22 for a persistent cough. The cough has been going on for months and causes the patient to have some reproducible chest pain. Patient is on Lisinopril for BP.      ED course:  Vitals: /91, HR 86, T 96.6, RR 20 SpO2 97% on RA   Labs significant for: Wbc 10.52, Glucose 679 --> 440, BUN/Cr 31/1.80, Bilirubin 1.4, eGFR 43, Lipase 154, Acetone small, Trop 96.4  VBG: pH 7.27, pCO2 55, pO2 36, HCO3 25, Oxygen Saturation 60.7  UA: pH 6.5, trace ketone, trace leuk esterase, wbc 12, occasional bacteria, glucose >1000  EKbpm, QTc 420, sinus tachy  In ED given 10u Insulin x1, 8u Insulin x1, 1L NaCl bolus x2    Imaging  CT Head: No acute intracranial  hemorrhage, mass effect or midline shift. Generalized volume loss. Mild paranasal sinusitis.  CT a/p: No evidence of acute inflammatory or obstructive process in   the abdomen and pelvis. (28 Mar 2024 15:58)      ---  HOSPITAL COURSE/PERTINENT LABS/PROCEDURES PERFORMED/PENDING TESTS:   Pt was admitted for  Type 2 diabetes mellitus with hyperosmolar hyperglycemic state (HHS).   ·  Plan: Glucose on admission 679. new diagnose DM.   - Sugars likely exacerbated by recent Prednisone use   - CT a/p: No evidence of acute inflammatory or obstructive process in the abdomen and pelvis.  - UA: pH 6.5, trace ketone, trace leuk esterase, wbc 12, occasional bacteria, glucose >1000  - VBG: pH 7.27, pCO2 55, pO2 36, HCO3 25, Oxygen Saturation 60.7  - Glucose 679 --> 440->200s - A1c 11.4/12.1, normal  anion gap - + Beta hydroxybutyrate   -started IVF and lantus with premeal RISS , FS level improved.  metformin added   encouraged patient continue to remain inpatient till further optimization and endocrine evaluation but patient request to be disparaged today. contact number of diabetic team was provided to patient for after discharge resource. close out patient follow up and monitoring discussed. patient agree   HLD : lovaza started for elevated TG   chronic cough: ACEI discontinued, inhaler uss as needed, was seen by pulmonary   h/o CVA, HTN: cardio evaluated, TTE: < from: TTE W or WO Ultrasound Enhancing Agent (24 @ 12:18) >     1. Technically difficult image quality.   2. Left ventricular systolic function is normal with an ejection fraction of 55 % by Jones's method of disks.   3. Normal left ventricular diastolic function.   4. Normal right ventricular cavity size and probably normal systolic function.   5. Trace mitral regurgitation.   6. Mild tricuspid regurgitation.   7. Estimated pulmonary artery systolic pressure is 22 mmHg, consistent with normal pulmonary artery pressure.    < end of copied text >            Patient is stable for discharge as per primary medical team and consultants.    Patient showed improvement throughout hospitalization. Patient was seen and examined on day of discharge. Patient was medically optimized for discharge with close outpatient follow up.    ---  PATIENT CONDITION:  - stable    --  VITALS:   T(C): 36.8 (24 @ 14:51), Max: 36.8 (24 @ 14:51)  HR: 85 (24 @ 14:51) (80 - 85)  BP: 120/78 (24 @ 14:51) (101/64 - 120/78)  RR: 18 (24 @ 14:51) (17 - 18)  SpO2: 95% (24 @ 14:51) (95% - 98%)    PHYSICAL EXAM ON DAY OF DISCHARGE:    GENERAL: NAD, lying in bed   HEAD:  Normocephalic  EYES:  conjunctiva and sclera clear  ENT: Moist mucous membranes  NECK: Supple  CHEST/LUNG: Clear to auscultation bilaterally; No rales or rhonchi; no wheezing. Unlabored respirations  HEART: Regular rate and rhythm; S1S2+  ABDOMEN: Bowel sounds present; Soft, epigastric discomfort upon palpation, Nondistended.   EXTREMITIES:  + distal Peripheral Pulses;  No cyanosis, or edema  NERVOUS SYSTEM:  Alert & Oriented X3;  No gross focal deficits   MSK: moves all extremities  SKIN: No rashes          ADMISSION DATE:  24    ---  FROM ADMISSION H+P:   HPI:  Patient is  57yo M with a PMH of CVA, HTN, Gout, HLD, GERD who presents to the ED with weakness and polyuria. Patient notes that this past Monday, he began experiencing some chest pain, increased urinary frequency, decreased PO intake, dizziness weakness, vomiting, slurred speech, lock jaw and dizziness. His symptoms have gotten progressively worse throughout the week. This AM he saw his PCP who did a finger stick. Patients blood glucose was >500 in the PCP office. Patient arrived to the ED, and his blood glucose was >600. Patient has no personal history of diabetes, but  significant family history of diabetes in both his parents. Of note, patient was recently started on a 5 day course of Prednisone 20mg BID on 3/22 for a persistent cough. The cough has been going on for months and causes the patient to have some reproducible chest pain. Patient is on Lisinopril for BP.      ED course:  Vitals: /91, HR 86, T 96.6, RR 20 SpO2 97% on RA   Labs significant for: Wbc 10.52, Glucose 679 --> 440, BUN/Cr 31/1.80, Bilirubin 1.4, eGFR 43, Lipase 154, Acetone small, Trop 96.4  VBG: pH 7.27, pCO2 55, pO2 36, HCO3 25, Oxygen Saturation 60.7  UA: pH 6.5, trace ketone, trace leuk esterase, wbc 12, occasional bacteria, glucose >1000  EKbpm, QTc 420, sinus tachy  In ED given 10u Insulin x1, 8u Insulin x1, 1L NaCl bolus x2    Imaging  CT Head: No acute intracranial  hemorrhage, mass effect or midline shift. Generalized volume loss. Mild paranasal sinusitis.  CT a/p: No evidence of acute inflammatory or obstructive process in   the abdomen and pelvis. (28 Mar 2024 15:58)      ---  HOSPITAL COURSE/PERTINENT LABS/PROCEDURES PERFORMED/PENDING TESTS:   Pt was admitted for  Type 2 diabetes mellitus with hyperosmolar hyperglycemic state (HHS).   ·  Plan: Glucose on admission 679. new diagnose DM.   - Sugars likely exacerbated by recent Prednisone use   - CT a/p: No evidence of acute inflammatory or obstructive process in the abdomen and pelvis.  - UA: pH 6.5, trace ketone, trace leuk esterase, wbc 12, occasional bacteria, glucose >1000  - VBG: pH 7.27, pCO2 55, pO2 36, HCO3 25, Oxygen Saturation 60.7  - Glucose 679 --> 440->200s - A1c 11.4/12.1, normal  anion gap - + Beta hydroxybutyrate   -started IVF and lantus with premeal RISS , FS level improved.  metformin added   - Endo eval noted, medication regimen for discharge discussed. contact number of diabetic team was provided to patient for after discharge resource. close out patient follow up and monitoring discussed. patient agree   HLD : lovaza started for elevated TG   chronic cough: ACEI discontinued, inhaler uss as needed, was seen by pulmonary   h/o CVA, HTN: cardio evaluated, TTE: < from: TTE W or WO Ultrasound Enhancing Agent (24 @ 12:18) >  1. Technically difficult image quality.   2. Left ventricular systolic function is normal with an ejection fraction of 55 % by Jones's method of disks.   3. Normal left ventricular diastolic function.   4. Normal right ventricular cavity size and probably normal systolic function.   5. Trace mitral regurgitation.   6. Mild tricuspid regurgitation.   7. Estimated pulmonary artery systolic pressure is 22 mmHg, consistent with normal pulmonary artery pressure.              Patient is stable for discharge as per primary medical team and consultants.    Patient showed improvement throughout hospitalization. Patient was seen and examined on day of discharge. Patient was medically optimized for discharge with close outpatient follow up.    ---  PATIENT CONDITION:  - stable    --  VITALS:   T(C): 36.8 (24 @ 14:51), Max: 36.8 (24 @ 14:51)  HR: 85 (24 @ 14:51) (80 - 85)  BP: 120/78 (24 @ 14:51) (101/64 - 120/78)  RR: 18 (24 @ 14:51) (17 - 18)  SpO2: 95% (24 @ 14:51) (95% - 98%)    PHYSICAL EXAM ON DAY OF DISCHARGE:    GENERAL: NAD, lying in bed   HEAD:  Normocephalic  EYES:  conjunctiva and sclera clear  ENT: Moist mucous membranes  NECK: Supple  CHEST/LUNG: Clear to auscultation bilaterally; No rales or rhonchi; no wheezing. Unlabored respirations  HEART: Regular rate and rhythm; S1S2+  ABDOMEN: Bowel sounds present; Soft, epigastric discomfort upon palpation, Nondistended.   EXTREMITIES:  + distal Peripheral Pulses;  No cyanosis, or edema  NERVOUS SYSTEM:  Alert & Oriented X3;  No gross focal deficits   MSK: moves all extremities  SKIN: No rashes

## 2024-03-30 NOTE — DISCHARGE NOTE NURSING/CASE MANAGEMENT/SOCIAL WORK - NSDCPEFALRISK_GEN_ALL_CORE
For information on Fall & Injury Prevention, visit: [email or web address] OR  [email or web address] OR  https://www.cdc.gov/steadi/patient.html

## 2024-03-30 NOTE — PROGRESS NOTE ADULT - SUBJECTIVE AND OBJECTIVE BOX
Patient is a 58y old  Male who presents with a chief complaint of HHS (30 Mar 2024 15:38)      INTERVAL HPI/OVERNIGHT EVENTS:    denies cough, shortness of breath, or chest pain    MEDICATIONS  (STANDING):  allopurinol 50 milliGRAM(s) Oral daily  aspirin enteric coated 81 milliGRAM(s) Oral daily  atorvastatin 80 milliGRAM(s) Oral at bedtime  benzonatate 100 milliGRAM(s) Oral three times a day  dextrose 5%. 1000 milliLiter(s) (100 mL/Hr) IV Continuous <Continuous>  dextrose 5%. 1000 milliLiter(s) (50 mL/Hr) IV Continuous <Continuous>  dextrose 50% Injectable 12.5 Gram(s) IV Push once  dextrose 50% Injectable 25 Gram(s) IV Push once  dextrose 50% Injectable 25 Gram(s) IV Push once  fenofibrate Tablet 145 milliGRAM(s) Oral daily  glucagon  Injectable 1 milliGRAM(s) IntraMuscular once  guaifenesin/dextromethorphan Oral Liquid 10 milliLiter(s) Oral every 4 hours  heparin   Injectable 5000 Unit(s) SubCutaneous every 12 hours  insulin glargine Injectable (LANTUS) 16 Unit(s) SubCutaneous at bedtime  insulin lispro (ADMELOG) corrective regimen sliding scale   SubCutaneous at bedtime  insulin lispro (ADMELOG) corrective regimen sliding scale   SubCutaneous three times a day before meals  insulin lispro Injectable (ADMELOG) 4 Unit(s) SubCutaneous before lunch  insulin lispro Injectable (ADMELOG) 4 Unit(s) SubCutaneous before breakfast  insulin lispro Injectable (ADMELOG) 4 Unit(s) SubCutaneous before dinner  metFORMIN 500 milliGRAM(s) Oral two times a day  montelukast 10 milliGRAM(s) Oral daily  omega-3-Acid Ethyl Esters 4 Gram(s) Oral daily  pantoprazole    Tablet 40 milliGRAM(s) Oral two times a day      MEDICATIONS  (PRN):  acetaminophen     Tablet .. 650 milliGRAM(s) Oral every 6 hours PRN Temp greater or equal to 38C (100.4F), Mild Pain (1 - 3)  albuterol    90 MICROgram(s) HFA Inhaler 2 Puff(s) Inhalation every 4 hours PRN Shortness of Breath and/or Wheezing  aluminum hydroxide/magnesium hydroxide/simethicone Suspension 30 milliLiter(s) Oral every 4 hours PRN Dyspepsia  dextrose Oral Gel 15 Gram(s) Oral once PRN Blood Glucose LESS THAN 70 milliGRAM(s)/deciliter  melatonin 3 milliGRAM(s) Oral at bedtime PRN Insomnia  ondansetron Injectable 4 milliGRAM(s) IV Push every 8 hours PRN Nausea and/or Vomiting      Allergies    No Known Allergies    Intolerances        PAST MEDICAL & SURGICAL HISTORY:  HTN (hypertension)      Stroke      HLD (hyperlipidemia)      Gout          Vital Signs Last 24 Hrs  T(C): 36.8 (30 Mar 2024 14:51), Max: 36.8 (30 Mar 2024 14:51)  T(F): 98.2 (30 Mar 2024 14:51), Max: 98.2 (30 Mar 2024 14:51)  HR: 85 (30 Mar 2024 14:51) (80 - 85)  BP: 120/78 (30 Mar 2024 14:51) (101/64 - 120/78)  BP(mean): --  RR: 18 (30 Mar 2024 14:51) (17 - 18)  SpO2: 95% (30 Mar 2024 14:51) (95% - 98%)    Parameters below as of 30 Mar 2024 14:51  Patient On (Oxygen Delivery Method): room air        PHYSICAL EXAMINATION:    GENERAL: The patient is awake and alert in no apparent distress.     HEENT: Head is normocephalic and atraumatic.     NECK: no JVD    LUNGS: Clear to auscultation without wheezing, rales or rhonchi; respirations unlabored    HEART: Regular rate and rhythm without murmur.    ABDOMEN: Soft, nontender, and nondistended.      EXTREMITIES: Without any cyanosis, clubbing, rash, lesions or edema.    NEUROLOGIC: Grossly intact.    SKIN: No ulceration or induration present.      LABS:                        12.8   5.87  )-----------( 162      ( 30 Mar 2024 06:16 )             35.6     03-30    142  |  111<H>  |  16  ----------------------------<  256<H>  3.7   |  25  |  1.20    Ca    7.7<L>      30 Mar 2024 06:16    TPro  6.4  /  Alb  3.5  /  TBili  1.0  /  DBili  x   /  AST  51<H>  /  ALT  57  /  AlkPhos  89  03-29      Urinalysis Basic - ( 30 Mar 2024 06:16 )    Color: x / Appearance: x / SG: x / pH: x  Gluc: 256 mg/dL / Ketone: x  / Bili: x / Urobili: x   Blood: x / Protein: x / Nitrite: x   Leuk Esterase: x / RBC: x / WBC x   Sq Epi: x / Non Sq Epi: x / Bacteria: x        CARDIAC MARKERS ( 28 Mar 2024 21:24 )  x     / x     / 84 U/L / x     / 1.1 ng/mL          Assessment:    Persistent cough  New onset diabetes  Acute Kidney Injury - markedly improved  Hx GERD  Hx CVA x 2    Plan:    Continue Protonix + Singulair  For discharge today  Endocrinology follow-up after discharge

## 2024-03-30 NOTE — PHARMACOTHERAPY INTERVENTION NOTE - COMMENTS
Meds to beds requested by provider.    The following prescriptions were filled at [facility]:  Insulin lispro, glargine, albuterol, omeprazole, aspirin, omega-3, glucometer & testing supplies    Medications delivered by pharmacist at bedside and counseled on indication, directions, and side effects. Patient was educated on how to use insulin, utilized demonstration supplies and patient verbalized understanding with teach-back method. Patient had no further questions and was appreciative of the counseling/demonstration. Patient was made aware that out of stock medications will be delivered to the patient's home on Monday 4/1/24.

## 2024-03-30 NOTE — DISCHARGE NOTE PROVIDER - NSDCMRMEDTOKEN_GEN_ALL_CORE_FT
Admelog SoloStar 100 units/mL injectable solution: 4 unit(s) subcutaneous 3 times a day unit(s) subcutaneous 3 times a day (with meals)  Admelog SoloStar 100 units/mL injectable solution: 0.1 unit(s) injectable 3 times a day as needed for  hyperglycemia use as insulin sliding scale :   2 Unit(s) if Glucose 151 - 200  4 Unit(s) if Glucose 201 - 250  6 Unit(s) if Glucose 251 - 300  8 Unit(s) if Glucose 301 - 350  10 Unit(s) if Glucose 351 - 400  12 Unit(s) if Glucose Greater Than 400  albuterol 90 mcg/inh inhalation aerosol: 2 puff(s) inhaled every 4 hours as needed for Shortness of Breath and/or Wheezing  alcohol swabs: Apply topically to affected area 4 times a day  allopurinol 100 mg oral tablet: 0.5 tab(s) orally once a day  aspirin 81 mg oral delayed release tablet: 1 tab(s) orally once a day  atorvastatin 80 mg oral tablet: 1 tab(s) orally once a day (at bedtime)  fenofibrate 145 mg oral tablet: 1 tab(s) orally once a day  glucometer (per patient&#x27;s insurance): Test blood sugars four times a day. Dispense #1 glucometer.  glucose tablets: Follow instructions on bottle when sugar is low.  Insulin Pen Needles, 4mm: 1 application subcutaneously 4 times a day. ** Use with insulin pen **  lancets: 1 application subcutaneously 4 times a day  Lantus Solostar Pen 100 units/mL subcutaneous solution: 16 unit(s) subcutaneous once a day (at bedtime)  montelukast 10 mg oral tablet: 1 tab(s) orally once a day  omega-3 polyunsaturated fatty acids ethyl esters 1000 mg oral capsule: 4 cap(s) orally once a day  omeprazole 40 mg oral delayed release capsule: 1 cap(s) orally 2 times a day  sucralfate 1 g oral tablet: 1 tab(s) orally once a day  test strips (per patient&#x27;s insurance): 1 application subcutaneously 4 times a day. ** Compatible with patient&#x27;s glucometer **   Admelog SoloStar 100 units/mL injectable solution: 4 unit(s) subcutaneous 3 times a day unit(s) subcutaneous 3 times a day (with meals)  Admelog SoloStar 100 units/mL injectable solution: 0.1 unit(s) injectable 3 times a day as needed for  hyperglycemia use as insulin sliding scale :   2 Unit(s) if Glucose 151 - 200  4 Unit(s) if Glucose 201 - 250  6 Unit(s) if Glucose 251 - 300  8 Unit(s) if Glucose 301 - 350  10 Unit(s) if Glucose 351 - 400  12 Unit(s) if Glucose Greater Than 400  albuterol 90 mcg/inh inhalation aerosol: 2 puff(s) inhaled every 4 hours as needed for Shortness of Breath and/or Wheezing  allopurinol 100 mg oral tablet: 0.5 tab(s) orally once a day  aspirin 81 mg oral delayed release tablet: 1 tab(s) orally once a day  fenofibrate 145 mg oral tablet: 1 tab(s) orally once a day  Lantus Solostar Pen 100 units/mL subcutaneous solution: 16 unit(s) subcutaneous once a day (at bedtime)  montelukast 10 mg oral tablet: 1 tab(s) orally once a day  omega-3 polyunsaturated fatty acids ethyl esters 1000 mg oral capsule: 4 cap(s) orally once a day  omeprazole 40 mg oral delayed release capsule: 1 cap(s) orally 2 times a day  sucralfate 1 g oral tablet: 1 tab(s) orally once a day

## 2024-03-30 NOTE — PROGRESS NOTE ADULT - ASSESSMENT
58 year old male past medical history of CVA x 2 sp TPA ten years ago, htn, hld, gout, presenting from home with increased urination, thirst, ten pound weight loss decreased appetite. IN ed found with gluocse > 600 with elevated troponin. Cardiology consultation now being obtained.    Elevated Troponin  - Ekg Sinus tachycardia 104.  No ischemic changes  - Troponins slightly elevated, peaked at 117 and downtrended.  No need to monitor  - Trend is not consistent with true plaque rupture but likely, demand in the setting of HHS  - No anginal complaints  - TTE normal ef    - Recommend outpatient Holter to monitor for occult afib given two prior CVA.  Sees Cardio, dr. Maciascienid  - cont asa, statin  - Continue Tricor      - BP stable and controlled  - Recommend ACEI/ARB for DM but not if bp will remain soft    - Monitor and replete electrolytes. Keep K>4.0 and Mg>2.0.  - Will continue to follow
58 year old male past medical history of CVA x 2 sp TPA ten years ago, htn, hld, gout, presenting from home with increased urination, thirst, ten pound weight loss decreased appetite. IN ed found with gluocse > 600 with elevated troponin. Cardiology consultation now being obtained.    Elevated Troponin  - Ekg Sinus tachycardia 104.  No ischemic changes  - Troponins slightly elevated, peaked at 117 and downtrended.  No need to monitor  - Trend is not consistent with true plaque rupture but likely, demand in the setting of HHS  - No anginal complaints  - Follow up TTE    - Recommend outpatient Holter to monitor for occult afib given two prior CVA.  Sees Cardio, dr. Polanco  - Start ASA 81 mg daily  - Continue Tricor  - Obtain lipid profile  - HgbA1c = 11.  Endo eval pending    - BP stable and controlled  - Recommend ACEI/ARB for DM    - Monitor and replete electrolytes. Keep K>4.0 and Mg>2.0.  - Will continue to follow    Keila Mena DNP, NP-C, AGACNP-C  Cardiology   Call TEAMS       
Patient is  59yo M with a PMH of CVA, HTN, Gout, HLD, GERD who presents to the ED with weakness and polyuria.

## 2024-03-30 NOTE — DISCHARGE NOTE PROVIDER - PROVIDER TOKENS
PROVIDER:[TOKEN:[[id number]],FOLLOWUP:[1 week]],PROVIDER:[TOKEN:[[id number]],FOLLOWUP:[1-3 days]] PROVIDER:[TOKEN:[[id number]],FOLLOWUP:[1 week]],PROVIDER:[TOKEN:[[id number]],FOLLOWUP:[1-3 days]],PROVIDER:[TOKEN:[[id number]],FOLLOWUP:[2 weeks]],FREE:[LAST:[your cardiologist],PHONE:[(   )    -],FAX:[(   )    -],FOLLOWUP:[Routine]]

## 2024-03-30 NOTE — DIETITIAN INITIAL EVALUATION ADULT - POUNDS LOST/GAINED
BP is well controlled, no reports of dizziness or falls  continue current regimen at this time - carvedilol 12.5 mg bid, furosemide 20 mg daily, losartan 25 mg daily 9

## 2024-03-30 NOTE — CONSULT NOTE ADULT - SUBJECTIVE AND OBJECTIVE BOX
[facility] - Michi Razo, Sunshine, Ronnell, Susan, Amauri Alcantar  Office Number: [phone]    Initial Consult Note    CHIEF COMPLAINT: Patient is a 58y old  Male who presents with a chief complaint of elevated glucose     HPI:    58 year old male past medical history of CVA x 2 sp TPA ten years ago, htn, hld, gout, presenting from home with increased urination, thirst, ten pound weight loss decreased appetite. IN ed found with gluocse > 600 with elevated troponin. Cardiology consultation now being obtained. Follows regularly with his PMD, [place] [facility] [place] with  unremarkable work up he reports. Has complaints of chronic nonproductive cough. No fever chills recent illness or travel.   Does have right sided reproducible chest pain which is worse when he coughs and tender to touch. Has chronic dyspnea on exertion. No exertional chest pain dizziness palpitations syncope.   ALso with complaints of lock jaw on monday , and three day history last week of right arm numbness.     PAST MEDICAL & SURGICAL HISTORY:  HTN (hypertension)      Stroke          SOCIAL HISTORY:  No tobacco, ethanol, or drug abuse.    FAMILY HISTORY:    No family history of acute MI or sudden cardiac death.    MEDICATIONS  (STANDING):  insulin lispro Injectable (ADMELOG) 10 Unit(s) SubCutaneous Once    MEDICATIONS  (PRN):      Allergies    No Known Allergies    Intolerances        REVIEW OF SYSTEMS:  All other review of systems is negative unless indicated above    VITAL SIGNS:   Vital Signs Last 24 Hrs  T(C): 35.9 (28 Mar 2024 12:58), Max: 35.9 (28 Mar 2024 12:58)  T(F): 96.6 (28 Mar 2024 12:58), Max: 96.6 (28 Mar 2024 12:58)  HR: 86 (28 Mar 2024 12:58) (86 - 86)  BP: 140/91 (28 Mar 2024 12:58) (140/91 - 140/91)  BP(mean): --  RR: 20 (28 Mar 2024 12:58) (20 - 20)  SpO2: 97% (28 Mar 2024 12:58) (97% - 97%)    Parameters below as of 28 Mar 2024 12:58  Patient On (Oxygen Delivery Method): room air        I&O's Summary      On Exam:    Constitutional: NAD, alert and oriented x 3  Lungs:  Non-labored, breath sounds are clear bilaterally, No wheezing, rales or rhonchi  Cardiovascular: RRR.  S1 and S2 positive.  No murmurs, rubs, gallops or clicks  Gastrointestinal: Bowel Sounds present, soft, nontender.   Lymph: No peripheral edema. No cervical lymphadenopathy.  Neurological: Alert, no focal deficits  Skin: No rashes or ulcers   Psych:  Mood & affect appropriate.    LABS: All Labs Reviewed:                        16.0   10.52 )-----------( 242      ( 28 Mar 2024 13:30 )             45.0     28 Mar 2024 13:30    136    |  101    |  31     ----------------------------<  679    4.2     |  26     |  1.80     Ca    9.6        28 Mar 2024 13:30  Phos  4.3       28 Mar 2024 13:30  Mg     2.6       28 Mar 2024 13:30    TPro  8.0    /  Alb  4.2    /  TBili  1.4    /  DBili  x      /  AST  25     /  ALT  44     /  AlkPhos  117    28 Mar 2024 13:30          Blood Culture:         RADIOLOGY:    EKG:< from: 12 Lead ECG (03.28.24 @ 13:15) >    Ventricular Rate 104 BPM    Atrial Rate 104 BPM    P-R Interval 140 ms    QRS Duration 70 ms    Q-T Interval 320 ms    QTC Calculation(Bazett) 420 ms    P Axis 63 degrees    R Axis -21 degrees    T Axis 52 degrees    Diagnosis Line Sinus tachycardia  Otherwise normal ECG    < end of copied text >            
  Patient is a 58y old  Male who presents with a chief complaint of HHS (30 Mar 2024 12:29)      Reason For Consult: HHS    HPI:  Patient is  57yo M with a PMH of CVA, HTN, Gout, HLD, GERD who presents to the ED with weakness and polyuria. Patient notes that this past Monday, he began experiencing some chest pain, increased urinary frequency, decreased PO intake, dizziness weakness, vomiting, slurred speech, lock jaw and dizziness. His symptoms have gotten progressively worse throughout the week. This AM he saw his PCP who did a finger stick. Patients blood glucose was >500 in the PCP office. Patient arrived to the ED, and his blood glucose was >600. Patient has no personal history of diabetes, but  significant family history of diabetes in both his parents. Of note, patient was recently started on a 5 day course of Prednisone 20mg BID on 3/22 for a persistent cough. The cough has been going on for months and causes the patient to have some reproducible chest pain. Patient is on Lisinopril for BP.      ED course:  Vitals: /91, HR 86, T 96.6, RR 20 SpO2 97% on RA   Labs significant for: Wbc 10.52, Glucose 679 --> 440, BUN/Cr 31/1.80, Bilirubin 1.4, eGFR 43, Lipase 154, Acetone small, Trop 96.4  VBG: pH 7.27, pCO2 55, pO2 36, HCO3 25, Oxygen Saturation 60.7  UA: pH 6.5, trace ketone, trace leuk esterase, wbc 12, occasional bacteria, glucose >1000  EKbpm, QTc 420, sinus tachy  In ED given 10u Insulin x1, 8u Insulin x1, 1L NaCl bolus x2    Imaging  CT Head: No acute intracranial  hemorrhage, mass effect or midline shift. Generalized volume loss. Mild paranasal sinusitis.  CT a/p: No evidence of acute inflammatory or obstructive process in   the abdomen and pelvis. (28 Mar 2024 15:58)      PAST MEDICAL & SURGICAL HISTORY:  HTN (hypertension)      Stroke      HLD (hyperlipidemia)      Gout          FAMILY HISTORY:  FH: type 2 diabetes (Father, Mother)          Social History:    MEDICATIONS  (STANDING):  allopurinol 50 milliGRAM(s) Oral daily  aspirin enteric coated 81 milliGRAM(s) Oral daily  atorvastatin 80 milliGRAM(s) Oral at bedtime  benzonatate 100 milliGRAM(s) Oral three times a day  dextrose 5%. 1000 milliLiter(s) (50 mL/Hr) IV Continuous <Continuous>  dextrose 5%. 1000 milliLiter(s) (100 mL/Hr) IV Continuous <Continuous>  dextrose 50% Injectable 12.5 Gram(s) IV Push once  dextrose 50% Injectable 25 Gram(s) IV Push once  dextrose 50% Injectable 25 Gram(s) IV Push once  fenofibrate Tablet 145 milliGRAM(s) Oral daily  glucagon  Injectable 1 milliGRAM(s) IntraMuscular once  guaifenesin/dextromethorphan Oral Liquid 10 milliLiter(s) Oral every 4 hours  heparin   Injectable 5000 Unit(s) SubCutaneous every 12 hours  insulin glargine Injectable (LANTUS) 16 Unit(s) SubCutaneous at bedtime  insulin lispro (ADMELOG) corrective regimen sliding scale   SubCutaneous three times a day before meals  insulin lispro (ADMELOG) corrective regimen sliding scale   SubCutaneous at bedtime  insulin lispro Injectable (ADMELOG) 4 Unit(s) SubCutaneous before breakfast  insulin lispro Injectable (ADMELOG) 4 Unit(s) SubCutaneous before lunch  insulin lispro Injectable (ADMELOG) 4 Unit(s) SubCutaneous before dinner  metFORMIN 500 milliGRAM(s) Oral two times a day  montelukast 10 milliGRAM(s) Oral daily  omega-3-Acid Ethyl Esters 4 Gram(s) Oral daily  pantoprazole    Tablet 40 milliGRAM(s) Oral two times a day    MEDICATIONS  (PRN):  acetaminophen     Tablet .. 650 milliGRAM(s) Oral every 6 hours PRN Temp greater or equal to 38C (100.4F), Mild Pain (1 - 3)  albuterol    90 MICROgram(s) HFA Inhaler 2 Puff(s) Inhalation every 4 hours PRN Shortness of Breath and/or Wheezing  aluminum hydroxide/magnesium hydroxide/simethicone Suspension 30 milliLiter(s) Oral every 4 hours PRN Dyspepsia  dextrose Oral Gel 15 Gram(s) Oral once PRN Blood Glucose LESS THAN 70 milliGRAM(s)/deciliter  melatonin 3 milliGRAM(s) Oral at bedtime PRN Insomnia  ondansetron Injectable 4 milliGRAM(s) IV Push every 8 hours PRN Nausea and/or Vomiting        T(C): 36.8 (24 @ 14:51), Max: 36.8 (24 @ 14:51)  HR: 85 (24 @ 14:51) (80 - 85)  BP: 120/78 (24 @ 14:51) (101/64 - 120/78)  RR: 18 (24 @ 14:51) (17 - 18)  SpO2: 95% (24 @ 14:51) (95% - 98%)  Wt(kg): --    PHYSICAL EXAM:  CHEST/LUNG: Clear to percussion bilaterally; No rales, rhonchi, wheezing, or rubs  HEART: Regular rate and rhythm; No murmurs, rubs, or gallops  ABDOMEN: Soft, Nontender, Nondistended; Bowel sounds present  EXTREMITIES:  2+ Peripheral Pulses, No clubbing, cyanosis, or edema  SKIN: No rashes or lesions    CAPILLARY BLOOD GLUCOSE      POCT Blood Glucose.: 243 mg/dL (30 Mar 2024 11:49)  POCT Blood Glucose.: 383 mg/dL (30 Mar 2024 08:22)  POCT Blood Glucose.: 353 mg/dL (29 Mar 2024 22:15)  POCT Blood Glucose.: 204 mg/dL (29 Mar 2024 16:52)                            12.8   5.87  )-----------( 162      ( 30 Mar 2024 06:16 )             35.6       CMP:   @ 06:16  SGPT --  Albumin --   Alk Phos --   Anion Gap 6   SGOT --   Total Bili --   BUN 16   Calcium Total 7.7   CO2 25   Chloride 111   Creatinine 1.20   eGFR if AA --   eGFR if non AA --   Glucose 256   Potassium 3.7   Protein --   Sodium 142      Thyroid Function Tests:      Diabetes Tests:       Radiology:

## 2024-03-30 NOTE — DISCHARGE NOTE PROVIDER - CARE PROVIDER_API CALL
Perlman, Craig Douglas  Internal Medicine  [address] [place] [address], [address]  [place] [address]  Phone: [phone]  Fax: [phone]  Follow Up Time: 1 week    Mario Mccullough  [address] [address]  Phone: [phone]  Fax: [phone]  Follow Up Time: 1-3 days   Perlman, Craig Douglas  Internal Medicine  [address] [facility], [address]  [place] [address]  Phone: [phone]  Fax: [phone]  Follow Up Time: 1 week    Mario Mccullough  [facility]  [address] [place] [address]  Phone: [phone]  Fax: [phone]  Follow Up Time: 1-3 days    Fede Jarrett  Pulmonary Disease  [address] [place] [address]  Phone: [phone]  Fax: [phone]  Follow Up Time: 2 weeks    your cardiologist,   Phone: (   )    -  Fax: (   )    -  Follow Up Time: Routine

## 2024-03-30 NOTE — DIETITIAN INITIAL EVALUATION ADULT - PROBLEM SELECTOR PLAN 3
Elevated trops on admission, likely 2/2 [facility]   - EKbpm, QTc 420, sinus tachy  - Trop 96.4 --> 117.8  - Trend trops to peak  - Repeat EKG in AM   - F/u baseline TTE   - Start ASA due to hx prior CVA   - Continue fenofibrate   - Cardio consulted, f/u recs

## 2024-03-30 NOTE — DIETITIAN INITIAL EVALUATION ADULT - PROBLEM SELECTOR PLAN 2
Patient had KEREN on admission likely 2/2 [facility]   - UA: pH 6.5, trace ketone, trace leuk esterase, wbc 12, occasional bacteria, glucose >1000  - BUN/Cr 31/1.80  - Start continuous IVF   - Diabetes regimen for blood sugar control   - Avoid nephrotoxic meds  - Monitor daily CMP

## 2024-03-30 NOTE — DIETITIAN INITIAL EVALUATION ADULT - PERTINENT LABORATORY DATA
100
03-30    142  |  111<H>  |  16  ----------------------------<  256<H>  3.7   |  25  |  1.20    Ca    7.7<L>      30 Mar 2024 06:16  Phos  4.3     03-28  Mg     2.6     03-28    TPro  6.4  /  Alb  3.5  /  TBili  1.0  /  DBili  x   /  AST  51<H>  /  ALT  57  /  AlkPhos  89  03-29  POCT Blood Glucose.: 383 mg/dL (03-30-24 @ 08:22)  A1C with Estimated Average Glucose Result: 12.1 % (03-29-24 @ 06:43)  A1C with Estimated Average Glucose Result: 11.4 % (03-28-24 @ 13:30)

## 2024-03-30 NOTE — DISCHARGE NOTE NURSING/CASE MANAGEMENT/SOCIAL WORK - PATIENT PORTAL LINK FT
You can access the FollowMyHealth Patient Portal offered by [facility] by registering at the following website: [email or web address]. By joining [employer]’s FollowMyHealth portal, you will also be able to view your health information using other applications (apps) compatible with our system.

## 2024-03-30 NOTE — DIETITIAN INITIAL EVALUATION ADULT - PROBLEM SELECTOR PLAN 4
Two prior TIA's in the past.  - CT Head: No acute intracranial  hemorrhage, mass effect or midline shift. Generalized volume loss. Mild paranasal sinusitis  - Start ASA   - Continue fenofibrate    - F/u AM lipid panel   - Patient appears at baseline currently. If AMS develops, would consider repeat CT head and MRI head

## 2024-03-30 NOTE — PROGRESS NOTE ADULT - SUBJECTIVE AND OBJECTIVE BOX
[facility]    Michi Razo, Sunshine, Ronnell, Susan, Amauri      [phone]    CHIEF COMPLAINT: Patient is a 58y old  Male who presents with a chief complaint of Type 2 diabetes mellitus without complication     (30 Mar 2024 09:54)      Follow Up: cva, htn, dm with demand ischemia    Interim history: The patient reports no new symptoms.  Denies chest discomfort and shortness of breath.  No abdominal pain.  No new neurologic symptoms.      MEDICATIONS  (STANDING):  allopurinol 50 milliGRAM(s) Oral daily  aspirin enteric coated 81 milliGRAM(s) Oral daily  atorvastatin 80 milliGRAM(s) Oral at bedtime  benzonatate 100 milliGRAM(s) Oral three times a day  dextrose 5%. 1000 milliLiter(s) (50 mL/Hr) IV Continuous <Continuous>  dextrose 5%. 1000 milliLiter(s) (100 mL/Hr) IV Continuous <Continuous>  dextrose 50% Injectable 12.5 Gram(s) IV Push once  dextrose 50% Injectable 25 Gram(s) IV Push once  dextrose 50% Injectable 25 Gram(s) IV Push once  fenofibrate Tablet 145 milliGRAM(s) Oral daily  glucagon  Injectable 1 milliGRAM(s) IntraMuscular once  guaifenesin/dextromethorphan Oral Liquid 10 milliLiter(s) Oral every 4 hours  heparin   Injectable 5000 Unit(s) SubCutaneous every 12 hours  insulin glargine Injectable (LANTUS) 14 Unit(s) SubCutaneous at bedtime  insulin lispro (ADMELOG) corrective regimen sliding scale   SubCutaneous three times a day before meals  insulin lispro (ADMELOG) corrective regimen sliding scale   SubCutaneous at bedtime  insulin lispro Injectable (ADMELOG) 4 Unit(s) SubCutaneous before dinner  insulin lispro Injectable (ADMELOG) 4 Unit(s) SubCutaneous before breakfast  insulin lispro Injectable (ADMELOG) 4 Unit(s) SubCutaneous before lunch  montelukast 10 milliGRAM(s) Oral daily  omega-3-Acid Ethyl Esters 4 Gram(s) Oral daily  pantoprazole    Tablet 40 milliGRAM(s) Oral two times a day  sodium chloride 0.9%. 1000 milliLiter(s) (125 mL/Hr) IV Continuous <Continuous>    MEDICATIONS  (PRN):  acetaminophen     Tablet .. 650 milliGRAM(s) Oral every 6 hours PRN Temp greater or equal to 38C (100.4F), Mild Pain (1 - 3)  albuterol    90 MICROgram(s) HFA Inhaler 2 Puff(s) Inhalation every 4 hours PRN Shortness of Breath and/or Wheezing  aluminum hydroxide/magnesium hydroxide/simethicone Suspension 30 milliLiter(s) Oral every 4 hours PRN Dyspepsia  dextrose Oral Gel 15 Gram(s) Oral once PRN Blood Glucose LESS THAN 70 milliGRAM(s)/deciliter  melatonin 3 milliGRAM(s) Oral at bedtime PRN Insomnia  ondansetron Injectable 4 milliGRAM(s) IV Push every 8 hours PRN Nausea and/or Vomiting      REVIEW OF SYSTEMS:  eye, ent, GI, , allergic, dermatologic, musculoskeletal and neurologic are negative except as described above    Vital Signs Last 24 Hrs  T(C): 36.7 (30 Mar 2024 04:53), Max: 36.7 (30 Mar 2024 04:53)  T(F): 98.1 (30 Mar 2024 04:53), Max: 98.1 (30 Mar 2024 04:53)  HR: 80 (30 Mar 2024 04:53) (80 - 92)  BP: 101/64 (30 Mar 2024 04:53) (101/64 - 131/87)  BP(mean): --  RR: 17 (30 Mar 2024 04:53) (17 - 18)  SpO2: 95% (30 Mar 2024 04:53) (95% - 98%)    Parameters below as of 30 Mar 2024 04:53  Patient On (Oxygen Delivery Method): room air        I&O's Summary    29 Mar 2024 07:01  -  30 Mar 2024 07:00  --------------------------------------------------------  IN: 1375 mL / OUT: 0 mL / NET: 1375 mL        Telemetry past 24h: off    PHYSICAL EXAM:    Constitutional: well-nourished, well-developed, NAD   HEENT:  MMM, sclerae anicteric, conjunctivae clear, no oral cyanosis.  Pulmonary: Non-labored, breath sounds are clear bilaterally, No wheezing, rales or rhonchi  Cardiovascular: Regular, S1 and S2.  No murmur.  No rubs, gallops or clicks  Gastrointestinal: Bowel Sounds present, soft, nontender.   Lymph: No peripheral edema.   Neurological: Alert, no focal deficits  Skin: No rashes.  Psych:  Mood & affect appropriate    LABS: All Labs Reviewed:                        12.8   5.87  )-----------( 162      ( 30 Mar 2024 06:16 )             35.6                         14.4   7.25  )-----------( 195      ( 29 Mar 2024 06:43 )             39.6                         16.0   10.52 )-----------( 242      ( 28 Mar 2024 13:30 )             45.0     30 Mar 2024 06:16    142    |  111    |  16     ----------------------------<  256    3.7     |  25     |  1.20   29 Mar 2024 06:43    141    |  109    |  23     ----------------------------<  325    3.6     |  27     |  1.30   28 Mar 2024 21:24    140    |  108    |  26     ----------------------------<  309    3.7     |  23     |  1.30     Ca    7.7        30 Mar 2024 06:16  Ca    8.7        29 Mar 2024 06:43  Ca    8.2        28 Mar 2024 21:24  Phos  4.3       28 Mar 2024 13:30  Mg     2.6       28 Mar 2024 13:30    TPro  6.4    /  Alb  3.5    /  TBili  1.0    /  DBili  x      /  AST  51     /  ALT  57     /  AlkPhos  89     29 Mar 2024 06:43  TPro  8.0    /  Alb  4.2    /  TBili  1.4    /  DBili  x      /  AST  25     /  ALT  44     /  AlkPhos  117    28 Mar 2024 13:30      CARDIAC MARKERS ( 28 Mar 2024 21:24 )  x     / x     / 84 U/L / x     / 1.1 ng/mL      Blood Culture:        RADIOLOGY:    EKG:    Echo:    < from: TTE W or WO Ultrasound Enhancing Agent (03.29.24 @ 12:18) >    TRANSTHORACIC ECHOCARDIOGRAM REPORT  ________________________________________________________________________________                                      _______       Pt. Name:       FABIOLA WHITE Study Date:    3/29/2024  MRN:            [id number]       YOB: 1965  Accession #:    [id number]       Age:           58 years  Account#:       [id number]      Gender:        M  Heart Rate:                     Height:        68.90 in (175.00 cm)  Rhythm:                         Weight:        169.75 lb (77.00 kg)  Blood Pressure: 129/78 mmHg     BSA/BMI:       1.92 m² / 25.14 kg/m²  ________________________________________________________________________________________  Referring Physician:    [id number] Don Juares  Interpreting Physician: Odette Baugh MD  Primary Sonographer:    Alice Bryson RDCS    CPT:               ECHO TTE WO CON COMP W DOPP - 39138.m  Indication(s):     Abnormal electrocardiogram ECG/EKG - R94.31  Procedure:         Transthoracic echocardiogram with 2-D, M-mode and complete                     spectral and color flow Doppler.  Ordering Location: [facility]  Admission Status:  Inpatient  Study Information: Image quality for this study is technically difficult.    _______________________________________________________________________________________     CONCLUSIONS:      1. Technically difficult image quality.   2. Left ventricular systolic function is normal with an ejection fraction of 55 % by Jones's method of disks.   3. Normal left ventricular diastolic function.   4. Normal right ventricular cavity size and probably normal systolic function.   5. Trace mitral regurgitation.   6. Mild tricuspid regurgitation.   7. Estimated pulmonary artery systolic pressure is 22 mmHg, consistent with normal pulmonary artery pressure.    ________________________________________________________________________________________  FINDINGS:     Left Ventricle:  Left ventricular wall thickness is normal. Left ventricular systolic function is normal with a calculated ejection fraction of 55 % by the Jones's biplane method of disks. There is normal left ventricular diastolic function.     Right Ventricle:  The right ventricular cavity is normal in size and probably normal systolic function. Tricuspid annular plane systolic excursion (TAPSE) is 1.1 cm (normal >=1.7 cm).     Left Atrium:  The left atrium is normal with an indexed volume of 13.30 ml/m².     Right Atrium:  The right atrium is normal in size with an indexed volume of 7.95 ml/m².     Aortic Valve:  The aortic valve appears trileaflet with normal systolic excursion. There is focal calcification of the aortic valve leaflets. There is minimal thickening of the aortic valve leaflets. There is no aortic valve stenosis. There is trace aorticregurgitation.     Mitral Valve:  There is mitral valve thickening of the anterior and posterior leaflets. There is mild leaflet calcification. There is trace mitral regurgitation.     Tricuspid Valve:  There is mild tricuspid regurgitation. Estimated pulmonary artery systolic pressure is 22 mmHg, consistent with normal pulmonary artery pressure.     Pulmonic Valve:  Structurally normal pulmonic valve with normal leaflet excursion. There is trace pulmonic regurgitation.     Aorta:  The aortic root at the sinuses of Valsalva is normal in size. The ascending aorta diameter is normal in size.     Pericardium:  No pericardial effusion seen.     Systemic Veins:  The inferior vena cava is normal in size measuring 1.12 cm in diameter, (normal <2.1cm) with normal inspiratory collapse (normal >50%) consistent with normal right atrial pressure (~3, range 0-5mmHg).  ____________________________________________________________________  QUANTITATIVE DATA:  Left Ventricle Measurements: (Indexed to BSA)     IVSd (2D):   0.6 cm  LVPWd (2D):  0.5 cm  LVIDd (2D):  5.1 cm  LVIDs (2D):  4.2 cm  LV Mass:     95 g   49.3 g/m²  LV Vol d, MOD A2C: 79.9 ml 41.52 ml/m²  LV Vol d, MOD A4C: 86.8 ml 45.10 ml/m²  LV Vol d, MOD BP:  83.4 ml 43.33 ml/m²  LV Vol s, MOD A2C: 29.3 ml 15.22 ml/m²  LV Vol s, MOD A4C: 49.4 ml 25.67 ml/m²  LV Vol s, MOD BP:  37.7 ml 19.61 ml/m²  LVOT SV MOD BP:    45.6 ml  LV EF% MOD BP:     55 %     MV E Vmax:    0.57 m/s  MV A Vmax:    0.77 m/s  MV E/A:       0.74  e' lateral:   8.38cm/s  e' medial:    5.66 cm/s  E/e' lateral: 6.75  E/e' medial:  10.00  E/e' Average: 8.06  MV DT:        180 msec    Aorta Measurements: (Normal range) (Indexed to BSA)     Sinuses of Valsalva: 3.50 cm (3.1 - 3.7 cm)  Ao Asc prox:         3.20 cm       Left Atrium Measurements: (Indexed to BSA)  LA Diam 2D: 3.10 cm    Right Ventricle Measurements: Right Atrial Measurements:     TAPSE: 1.1 cm                 RA Vol:       15.30 ml                                RA Vol Index: 7.95 ml/m²       LVOT/ RVOT/ Qp/Qs Data: (Indexed to BSA)  LVOT Vmax: 0.92 m/s  LVOT VTI:  16.00 cm    Mitral Valve Measurements:     MV E Vmax: 0.6 m/s         MR Vmax:          1.33 m/s  MV A Vmax: 0.8 m/s         MR Peak Gradient: 7.1 mmHg  MV E/A:    0.7       Tricuspid Valve Measurements:     TV Vmax:          1.2 m/s  TV Peak Gradient: 6.2 mmHg  TR Vmax:          2.2 m/s  TR Peak Gradient: 19.0 mmHg  RA Pressure:      3 mmHg  PASP:             22 mmHg    ________________________________________________________________________________________  Electronically signed on 3/29/2024 at 2:51:44 PM by Odette Baugh MD         *** Final ***    < end of copied text >

## 2024-03-30 NOTE — DIETITIAN INITIAL EVALUATION ADULT - SIGNS/SYMPTOMS
as evidenced by 5% weight loss in 1 week, with uncontrolled blood sugars, inadequate energy intake  as evidenced by new DM dx, A1c 12.1% elevated blood sugars

## 2024-03-30 NOTE — DISCHARGE NOTE PROVIDER - CARE PROVIDERS DIRECT ADDRESSES
,[email or web address] ,[email or web address],DirectAddress_Unknown,[email or web address],[email or web address]

## 2024-03-30 NOTE — DISCHARGE NOTE PROVIDER - NSDCCPCAREPLAN_GEN_ALL_CORE_FT
Noted.    PRINCIPAL DISCHARGE DIAGNOSIS  Diagnosis: Type 2 diabetes mellitus with hyperosmolar hyperglycemic state (HHS)  Assessment and Plan of Treatment: CT a/p: No evidence of acute inflammatory or obstructive process in the abdomen and pelvis.  - UA: pH 6.5, trace ketone, trace leuk esterase, wbc 12, occasional bacteria, glucose >1000  - Glucose 679 --> 440->200s - A1c 11.4/12.1, normal  anion gap - + Beta hydroxybutyrate   -started IVF and lantus with premeal RISS , FS level improved.  metformin added   you were seen by endocrine specialist   diabetic diet as discussed with nutrition   out patient PCP and Endocrine follow up   home FS monitoring as discussed      SECONDARY DISCHARGE DIAGNOSES  Diagnosis: HTN (hypertension)  Assessment and Plan of Treatment: your BP medication discontinued, BP soft in hospital, PCP follow up   echo    1. Technically difficult image quality.   2. Left ventricular systolic function is normal with an ejection fraction of 55 % by Jones's method of disks.   3. Normal left ventricular diastolic function.   4. Normal right ventricular cavity size and probably normal systolic function.   5. Trace mitral regurgitation.   6. Mild tricuspid regurgitation.   7. Estimated pulmonary artery systolic pressure is 22 mmHg, consistent with normal pulmonary artery pressure.      Diagnosis: History of CVA in adulthood  Assessment and Plan of Treatment: Recommend outpatient Holter to monitor for occult afib given two prior CVA.  follow up  dr. Polanco    Diagnosis: GERD (gastroesophageal reflux disease)  Assessment and Plan of Treatment: PPI bid AND Carafate    Diagnosis: Chronic cough  Assessment and Plan of Treatment: Lisinopril discontinued , albuteral inhaler as needed, GERD treatment, singulair, out patient pulmonary follow up    Diagnosis: HLD (hyperlipidemia)  Assessment and Plan of Treatment: started lovaza , c/w fenofibrate, additional statin use if tolerated as out patient

## 2024-03-30 NOTE — DIETITIAN INITIAL EVALUATION ADULT - OTHER INFO
Reason for Admission: [facility]  History of Present Illness:   Patient is  59yo M with a PMH of CVA, HTN, Gout, HLD, GERD who presents to the ED with weakness and polyuria. Patient notes that this past Monday, he began experiencing some chest pain, increased urinary frequency, decreased PO intake, dizziness weakness, vomiting, slurred speech, lock jaw and dizziness. His symptoms have gotten progressively worse throughout the week. This AM he saw his PCP who did a finger stick. Patients blood glucose was >500 in the PCP office. Patient arrived to the ED, and his blood glucose was >600. Patient has no personal history of diabetes, but  significant family history of diabetes in both his parents. Of note, patient was recently started on a 5 day course of Prednisone 20mg BID on 3/22 for a persistent cough.  weight # reports 1 week weight loss now 169# although bed scale 171.9#  no food allergies  A1c 12.1% new DM dx DM specialist consulted POCT 383, 353   Triglycerides 533   HDL 25

## 2024-03-30 NOTE — DIETITIAN INITIAL EVALUATION ADULT - PERTINENT MEDS FT
MEDICATIONS  (STANDING):  allopurinol 50 milliGRAM(s) Oral daily  aspirin enteric coated 81 milliGRAM(s) Oral daily  atorvastatin 80 milliGRAM(s) Oral at bedtime  benzonatate 100 milliGRAM(s) Oral three times a day  dextrose 5%. 1000 milliLiter(s) (50 mL/Hr) IV Continuous <Continuous>  dextrose 5%. 1000 milliLiter(s) (100 mL/Hr) IV Continuous <Continuous>  dextrose 50% Injectable 12.5 Gram(s) IV Push once  dextrose 50% Injectable 25 Gram(s) IV Push once  dextrose 50% Injectable 25 Gram(s) IV Push once  fenofibrate Tablet 145 milliGRAM(s) Oral daily  glucagon  Injectable 1 milliGRAM(s) IntraMuscular once  guaifenesin/dextromethorphan Oral Liquid 10 milliLiter(s) Oral every 4 hours  heparin   Injectable 5000 Unit(s) SubCutaneous every 12 hours  insulin glargine Injectable (LANTUS) 14 Unit(s) SubCutaneous at bedtime  insulin lispro (ADMELOG) corrective regimen sliding scale   SubCutaneous at bedtime  insulin lispro (ADMELOG) corrective regimen sliding scale   SubCutaneous three times a day before meals  insulin lispro Injectable (ADMELOG) 4 Unit(s) SubCutaneous before breakfast  insulin lispro Injectable (ADMELOG) 4 Unit(s) SubCutaneous before dinner  insulin lispro Injectable (ADMELOG) 4 Unit(s) SubCutaneous before lunch  montelukast 10 milliGRAM(s) Oral daily  omega-3-Acid Ethyl Esters 4 Gram(s) Oral daily  pantoprazole    Tablet 40 milliGRAM(s) Oral two times a day  sodium chloride 0.9%. 1000 milliLiter(s) (125 mL/Hr) IV Continuous <Continuous>    MEDICATIONS  (PRN):  acetaminophen     Tablet .. 650 milliGRAM(s) Oral every 6 hours PRN Temp greater or equal to 38C (100.4F), Mild Pain (1 - 3)  albuterol    90 MICROgram(s) HFA Inhaler 2 Puff(s) Inhalation every 4 hours PRN Shortness of Breath and/or Wheezing  aluminum hydroxide/magnesium hydroxide/simethicone Suspension 30 milliLiter(s) Oral every 4 hours PRN Dyspepsia  dextrose Oral Gel 15 Gram(s) Oral once PRN Blood Glucose LESS THAN 70 milliGRAM(s)/deciliter  melatonin 3 milliGRAM(s) Oral at bedtime PRN Insomnia  ondansetron Injectable 4 milliGRAM(s) IV Push every 8 hours PRN Nausea and/or Vomiting

## 2024-03-30 NOTE — DIETITIAN INITIAL EVALUATION ADULT - PROBLEM SELECTOR PLAN 5
Chronic cough,  recently started on prednisone   - Hold prednisone in setting of HHS   - Continue home montelukast  - Start olimpia and tessalon pearls for cough  - Pulm Dr. Gaffney consulted, f/u recs

## 2024-03-30 NOTE — CONSULT NOTE ADULT - PROBLEM SELECTOR RECOMMENDATION 9
s/p iv hydration  s/p insulin iv infusion  transitioned to sc insulin mdii  diabetes teaching for insulin injections in process  pt to f/u with outpt endocrinology  cont cons cho diet

## 2024-03-30 NOTE — DIETITIAN INITIAL EVALUATION ADULT - ORAL INTAKE PTA/DIET HISTORY
patient seen in bed reports with improving PO intake. states that last week low PO with weight loss and change in taste. reports increased thirst. reports usually eats 3 meals per day. foods brought in for lunch and dinner. eats Khmer toast or egg and sausage sandwich for breakfast on white bread. eats [employer] take out salad or sandwich for lunch and dinner foods include fried foods. usually takes diet soda uses flavored creamer in coffee. at this time patient accepted education on consistent cho heart healthy diet discussed my plate, portion control and carbohydrate counting. verbal/written information. educated patient on importance of following with RD at MD office on outside.

## 2024-04-15 ENCOUNTER — OFFICE (OUTPATIENT)
Dept: URBAN - METROPOLITAN AREA CLINIC 109 | Facility: CLINIC | Age: 59
Setting detail: OPHTHALMOLOGY
End: 2024-04-15
Payer: COMMERCIAL

## 2024-04-15 DIAGNOSIS — E11.9: ICD-10-CM

## 2024-04-15 PROCEDURE — 92004 COMPRE OPH EXAM NEW PT 1/>: CPT | Performed by: OPHTHALMOLOGY

## 2024-10-21 NOTE — PROGRESS NOTE ADULT - PROBLEM SELECTOR PLAN 5
Chronic cough,  recently started on prednisone   - Hold prednisone in setting of HHS   - Continue home montelukast  - Start olimpia and tessalon pearls for cough  - Pulm Dr. Gaffney consulted, f/u recs
Yes